# Patient Record
Sex: FEMALE | Race: WHITE
[De-identification: names, ages, dates, MRNs, and addresses within clinical notes are randomized per-mention and may not be internally consistent; named-entity substitution may affect disease eponyms.]

---

## 2022-11-03 ENCOUNTER — HOSPITAL ENCOUNTER (OUTPATIENT)
Dept: HOSPITAL 100 - MTLAB | Age: 27
Discharge: HOME | End: 2022-11-03
Payer: COMMERCIAL

## 2022-11-03 DIAGNOSIS — M06.4: Primary | ICD-10-CM

## 2022-11-03 DIAGNOSIS — K58.2: ICD-10-CM

## 2022-11-03 DIAGNOSIS — R00.2: ICD-10-CM

## 2022-11-03 DIAGNOSIS — R76.8: ICD-10-CM

## 2022-11-03 DIAGNOSIS — G43.909: ICD-10-CM

## 2022-11-03 LAB
ALANINE AMINOTRANSFER ALT/SGPT: 24 U/L (ref 13–56)
ALBUMIN SERPL-MCNC: 4.2 G/DL (ref 3.2–5)
ALKALINE PHOSPHATASE: 63 U/L (ref 45–117)
ANION GAP: 6 (ref 5–15)
APTT PPP: 28.9 SECONDS (ref 24.1–36.2)
AST(SGOT): 18 U/L (ref 15–37)
BUN SERPL-MCNC: 12 MG/DL (ref 7–18)
BUN/CREAT RATIO: 13.5 RATIO (ref 10–20)
CALCIUM SERPL-MCNC: 9.2 MG/DL (ref 8.5–10.1)
CARBON DIOXIDE: 25 MMOL/L (ref 21–32)
CHLORIDE: 106 MMOL/L (ref 98–107)
CREAT UR-MCNC: 143 MG/DL
DEPRECATED RDW RBC: 41.1 FL (ref 35.1–43.9)
ERYTHROCYTE [DISTWIDTH] IN BLOOD: 12.6 % (ref 11.6–14.6)
EST GLOM FILT RATE - AFR AMER: 97 ML/MIN (ref 60–?)
EXAGEN: (no result)
GLOBULIN: 3.8 G/DL (ref 2.2–4.2)
GLUCOSE: 85 MG/DL (ref 74–106)
HCT VFR BLD AUTO: 40.8 % (ref 37–47)
HEMOGLOBIN: 13.4 G/DL (ref 12–15)
HGB BLD-MCNC: 13.4 G/DL (ref 12–15)
IMMATURE GRANULOCYTES COUNT: 0.01 X10^3/UL (ref 0–0)
LEUKOCYTE ESTERASE UR QL STRIP: 500 /UL
MCV RBC: 89.3 FL (ref 81–99)
MEAN CORP HGB CONC: 32.8 G/DL (ref 32–36)
MEAN PLATELET VOL.: 9.9 FL (ref 6.2–12)
NRBC FLAGGED BY ANALYZER: 0 % (ref 0–5)
PLATELET # BLD: 258 K/MM3 (ref 150–450)
PLATELET COUNT: 258 K/MM3 (ref 150–450)
POTASSIUM: 4 MMOL/L (ref 3.5–5.1)
PROT UR-MCNC: 10 MG/DL (ref ?–11.9)
PROT/CREAT UR: 70 MG/G CRE (ref 0–200)
PROTHROMBIN TIME (PROTIME)PT.: 12.9 SECONDS (ref 11.7–14.9)
RBC # BLD AUTO: 4.57 M/MM3 (ref 4.2–5.4)
RBC DISTRIBUTION WIDTH CV: 12.6 % (ref 11.6–14.6)
RBC DISTRIBUTION WIDTH SD: 41.1 FL (ref 35.1–43.9)
RBC UR QL: 10 /UL
SP GR UR: 1.01 (ref 1–1.03)
URINE PRESERVATIVE: (no result)
WBC # BLD AUTO: 6.2 K/MM3 (ref 4.4–11)
WHITE BLOOD COUNT: 6.2 K/MM3 (ref 4.4–11)

## 2022-11-03 PROCEDURE — 85730 THROMBOPLASTIN TIME PARTIAL: CPT

## 2022-11-03 PROCEDURE — 85598 HEXAGNAL PHOSPH PLTLT NEUTRL: CPT

## 2022-11-03 PROCEDURE — 82570 ASSAY OF URINE CREATININE: CPT

## 2022-11-03 PROCEDURE — 36415 COLL VENOUS BLD VENIPUNCTURE: CPT

## 2022-11-03 PROCEDURE — 84156 ASSAY OF PROTEIN URINE: CPT

## 2022-11-03 PROCEDURE — 85025 COMPLETE CBC W/AUTO DIFF WBC: CPT

## 2022-11-03 PROCEDURE — 85610 PROTHROMBIN TIME: CPT

## 2022-11-03 PROCEDURE — 81002 URINALYSIS NONAUTO W/O SCOPE: CPT

## 2022-11-03 PROCEDURE — 80053 COMPREHEN METABOLIC PANEL: CPT

## 2022-11-05 LAB
DILUTE RUSSELL VIPER VENOM: 36.7 SEC (ref 0–47)
PTT-LA: 38.5 SEC (ref 0–51.9)
SCREEN DRVVT: 36.7 SEC (ref 0–47)

## 2023-03-30 LAB
ALANINE AMINOTRANSFERASE (SGPT) (U/L) IN SER/PLAS: 15 U/L (ref 7–45)
ALBUMIN (G/DL) IN SER/PLAS: 4.4 G/DL (ref 3.4–5)
ALKALINE PHOSPHATASE (U/L) IN SER/PLAS: 56 U/L (ref 33–110)
ANION GAP IN SER/PLAS: 10 MMOL/L (ref 10–20)
ASPARTATE AMINOTRANSFERASE (SGOT) (U/L) IN SER/PLAS: 18 U/L (ref 9–39)
BASOPHILS (10*3/UL) IN BLOOD BY AUTOMATED COUNT: 0.04 X10E9/L (ref 0–0.1)
BASOPHILS/100 LEUKOCYTES IN BLOOD BY AUTOMATED COUNT: 0.5 % (ref 0–2)
BILIRUBIN TOTAL (MG/DL) IN SER/PLAS: 0.6 MG/DL (ref 0–1.2)
CALCIUM (MG/DL) IN SER/PLAS: 9.5 MG/DL (ref 8.6–10.3)
CARBON DIOXIDE, TOTAL (MMOL/L) IN SER/PLAS: 29 MMOL/L (ref 21–32)
CHLORIDE (MMOL/L) IN SER/PLAS: 106 MMOL/L (ref 98–107)
CREATININE (MG/DL) IN SER/PLAS: 0.85 MG/DL (ref 0.5–1.05)
EOSINOPHILS (10*3/UL) IN BLOOD BY AUTOMATED COUNT: 0.17 X10E9/L (ref 0–0.7)
EOSINOPHILS/100 LEUKOCYTES IN BLOOD BY AUTOMATED COUNT: 2.1 % (ref 0–6)
ERYTHROCYTE DISTRIBUTION WIDTH (RATIO) BY AUTOMATED COUNT: 12.6 % (ref 11.5–14.5)
ERYTHROCYTE MEAN CORPUSCULAR HEMOGLOBIN CONCENTRATION (G/DL) BY AUTOMATED: 31.1 G/DL (ref 32–36)
ERYTHROCYTE MEAN CORPUSCULAR VOLUME (FL) BY AUTOMATED COUNT: 95 FL (ref 80–100)
ERYTHROCYTES (10*6/UL) IN BLOOD BY AUTOMATED COUNT: 4.15 X10E12/L (ref 4–5.2)
GFR FEMALE: >90 ML/MIN/1.73M2
GLUCOSE (MG/DL) IN SER/PLAS: 88 MG/DL (ref 74–99)
HEMATOCRIT (%) IN BLOOD BY AUTOMATED COUNT: 39.2 % (ref 36–46)
HEMOGLOBIN (G/DL) IN BLOOD: 12.2 G/DL (ref 12–16)
IMMATURE GRANULOCYTES/100 LEUKOCYTES IN BLOOD BY AUTOMATED COUNT: 0.2 % (ref 0–0.9)
LEUKOCYTES (10*3/UL) IN BLOOD BY AUTOMATED COUNT: 8 X10E9/L (ref 4.4–11.3)
LYMPHOCYTES (10*3/UL) IN BLOOD BY AUTOMATED COUNT: 2.31 X10E9/L (ref 1.2–4.8)
LYMPHOCYTES/100 LEUKOCYTES IN BLOOD BY AUTOMATED COUNT: 28.8 % (ref 13–44)
MONOCYTES (10*3/UL) IN BLOOD BY AUTOMATED COUNT: 0.48 X10E9/L (ref 0.1–1)
MONOCYTES/100 LEUKOCYTES IN BLOOD BY AUTOMATED COUNT: 6 % (ref 2–10)
NEUTROPHILS (10*3/UL) IN BLOOD BY AUTOMATED COUNT: 5.01 X10E9/L (ref 1.2–7.7)
NEUTROPHILS/100 LEUKOCYTES IN BLOOD BY AUTOMATED COUNT: 62.4 % (ref 40–80)
PLATELETS (10*3/UL) IN BLOOD AUTOMATED COUNT: 280 X10E9/L (ref 150–450)
POTASSIUM (MMOL/L) IN SER/PLAS: 3.8 MMOL/L (ref 3.5–5.3)
PROTEIN TOTAL: 7 G/DL (ref 6.4–8.2)
SODIUM (MMOL/L) IN SER/PLAS: 141 MMOL/L (ref 136–145)
UREA NITROGEN (MG/DL) IN SER/PLAS: 12 MG/DL (ref 6–23)

## 2023-04-05 ENCOUNTER — HOSPITAL ENCOUNTER (OUTPATIENT)
Dept: HOSPITAL 100 - MTLAB | Age: 28
Discharge: HOME | End: 2023-04-05
Payer: COMMERCIAL

## 2023-04-05 DIAGNOSIS — Z79.899: ICD-10-CM

## 2023-04-05 DIAGNOSIS — M06.4: Primary | ICD-10-CM

## 2023-04-05 DIAGNOSIS — R76.8: ICD-10-CM

## 2023-04-05 LAB
ALANINE AMINOTRANSFER ALT/SGPT: 22 U/L (ref 13–56)
ALBUMIN SERPL-MCNC: 4.1 G/DL (ref 3.2–5)
ALKALINE PHOSPHATASE: 71 U/L (ref 45–117)
ANION GAP: 4 (ref 5–15)
AST(SGOT): 16 U/L (ref 15–37)
BUN SERPL-MCNC: 17 MG/DL (ref 7–18)
BUN/CREAT RATIO: 16.8 RATIO (ref 10–20)
CALCIUM SERPL-MCNC: 9.4 MG/DL (ref 8.5–10.1)
CARBON DIOXIDE: 26 MMOL/L (ref 21–32)
CHLORIDE: 108 MMOL/L (ref 98–107)
DEPRECATED RDW RBC: 42.7 FL (ref 35.1–43.9)
ERYTHROCYTE [DISTWIDTH] IN BLOOD: 12.5 % (ref 11.6–14.6)
EST GLOM FILT RATE - AFR AMER: 83 ML/MIN (ref 60–?)
GLOBULIN: 3.6 G/DL (ref 2.2–4.2)
GLUCOSE: 93 MG/DL (ref 74–106)
HCT VFR BLD AUTO: 36.4 % (ref 37–47)
HEMOGLOBIN: 12 G/DL (ref 12–15)
HGB BLD-MCNC: 12 G/DL (ref 12–15)
IMMATURE GRANULOCYTES COUNT: 0.01 X10^3/UL (ref 0–0)
MCV RBC: 92.2 FL (ref 81–99)
MEAN CORP HGB CONC: 33 G/DL (ref 32–36)
MEAN PLATELET VOL.: 9.8 FL (ref 6.2–12)
NRBC FLAGGED BY ANALYZER: 0 % (ref 0–5)
PLATELET # BLD: 292 K/MM3 (ref 150–450)
PLATELET COUNT: 292 K/MM3 (ref 150–450)
POTASSIUM: 4.3 MMOL/L (ref 3.5–5.1)
RBC # BLD AUTO: 3.95 M/MM3 (ref 4.2–5.4)
RBC DISTRIBUTION WIDTH CV: 12.5 % (ref 11.6–14.6)
RBC DISTRIBUTION WIDTH SD: 42.7 FL (ref 35.1–43.9)
WBC # BLD AUTO: 8.7 K/MM3 (ref 4.4–11)
WHITE BLOOD COUNT: 8.7 K/MM3 (ref 4.4–11)

## 2023-04-05 PROCEDURE — 36415 COLL VENOUS BLD VENIPUNCTURE: CPT

## 2023-04-05 PROCEDURE — 80053 COMPREHEN METABOLIC PANEL: CPT

## 2023-04-05 PROCEDURE — 85025 COMPLETE CBC W/AUTO DIFF WBC: CPT

## 2023-04-05 PROCEDURE — 82955 ASSAY OF G6PD ENZYME: CPT

## 2023-04-08 LAB — RBC # BLD: 3.94 X10E6/UL (ref 3.77–5.28)

## 2023-04-26 ENCOUNTER — TELEPHONE (OUTPATIENT)
Dept: PRIMARY CARE | Facility: CLINIC | Age: 28
End: 2023-04-26
Payer: COMMERCIAL

## 2023-04-27 PROBLEM — R23.3 EASY BRUISING: Status: ACTIVE | Noted: 2023-04-27

## 2023-04-27 PROBLEM — K63.5 COLON POLYP: Status: ACTIVE | Noted: 2023-04-27

## 2023-04-27 PROBLEM — R53.83 FATIGUE: Status: ACTIVE | Noted: 2023-04-27

## 2023-04-27 PROBLEM — R76.8 POSITIVE ANA (ANTINUCLEAR ANTIBODY): Status: ACTIVE | Noted: 2023-04-27

## 2023-04-27 PROBLEM — R60.9 EDEMA: Status: ACTIVE | Noted: 2023-04-27

## 2023-04-27 PROBLEM — J45.909 CHILDHOOD ASTHMA (HHS-HCC): Status: ACTIVE | Noted: 2023-04-27

## 2023-04-27 PROBLEM — M25.50 MULTIPLE JOINT PAIN: Status: ACTIVE | Noted: 2023-04-27

## 2023-04-27 PROBLEM — R00.0 SINUS TACHYCARDIA: Status: ACTIVE | Noted: 2023-04-27

## 2023-04-27 PROBLEM — J02.9 PHARYNGITIS: Status: ACTIVE | Noted: 2023-04-27

## 2023-04-27 PROBLEM — G56.02 CARPAL TUNNEL SYNDROME OF LEFT WRIST: Status: ACTIVE | Noted: 2023-04-27

## 2023-04-27 PROBLEM — M25.561 KNEE PAIN, BILATERAL: Status: ACTIVE | Noted: 2023-04-27

## 2023-04-27 PROBLEM — R00.2 PALPITATIONS: Status: ACTIVE | Noted: 2023-04-27

## 2023-04-27 PROBLEM — R52 GENERALIZED BODY ACHES: Status: ACTIVE | Noted: 2023-04-27

## 2023-04-27 PROBLEM — M25.562 KNEE PAIN, BILATERAL: Status: ACTIVE | Noted: 2023-04-27

## 2023-04-27 PROBLEM — K58.2 IRRITABLE BOWEL SYNDROME WITH BOTH CONSTIPATION AND DIARRHEA: Status: ACTIVE | Noted: 2023-04-27

## 2023-04-27 PROBLEM — G43.909 MIGRAINE HEADACHE: Status: ACTIVE | Noted: 2023-04-27

## 2023-04-27 PROBLEM — M06.9 RHEUMATOID ARTHRITIS (MULTI): Status: ACTIVE | Noted: 2023-04-27

## 2023-04-27 RX ORDER — METHOTREXATE 2.5 MG/1
2.5 TABLET ORAL
COMMUNITY
Start: 2022-11-22 | End: 2023-05-04 | Stop reason: ALTCHOICE

## 2023-04-27 RX ORDER — PREDNISONE 10 MG/1
10 TABLET ORAL
COMMUNITY
Start: 2022-08-11 | End: 2024-03-21 | Stop reason: ALTCHOICE

## 2023-04-27 RX ORDER — SUMATRIPTAN 50 MG/1
50 TABLET, FILM COATED ORAL 2 TIMES DAILY
COMMUNITY
Start: 2022-07-21

## 2023-05-04 ENCOUNTER — OFFICE VISIT (OUTPATIENT)
Dept: PRIMARY CARE | Facility: CLINIC | Age: 28
End: 2023-05-04
Payer: COMMERCIAL

## 2023-05-04 VITALS
SYSTOLIC BLOOD PRESSURE: 124 MMHG | HEIGHT: 61 IN | DIASTOLIC BLOOD PRESSURE: 72 MMHG | WEIGHT: 135 LBS | BODY MASS INDEX: 25.49 KG/M2

## 2023-05-04 DIAGNOSIS — E83.51 HYPOCALCEMIA: ICD-10-CM

## 2023-05-04 DIAGNOSIS — E87.6 HYPOKALEMIA: ICD-10-CM

## 2023-05-04 DIAGNOSIS — R53.83 OTHER FATIGUE: Primary | ICD-10-CM

## 2023-05-04 DIAGNOSIS — M05.79 RHEUMATOID ARTHRITIS INVOLVING MULTIPLE SITES WITH POSITIVE RHEUMATOID FACTOR (MULTI): ICD-10-CM

## 2023-05-04 PROBLEM — J02.9 PHARYNGITIS: Status: RESOLVED | Noted: 2023-04-27 | Resolved: 2023-05-04

## 2023-05-04 PROCEDURE — 1036F TOBACCO NON-USER: CPT | Performed by: INTERNAL MEDICINE

## 2023-05-04 PROCEDURE — 99214 OFFICE O/P EST MOD 30 MIN: CPT | Performed by: INTERNAL MEDICINE

## 2023-05-04 RX ORDER — IBUPROFEN 800 MG/1
800 TABLET ORAL 3 TIMES DAILY PRN
COMMUNITY
Start: 2023-04-05

## 2023-05-04 RX ORDER — SULFASALAZINE 500 MG/1
500 TABLET, DELAYED RELEASE ORAL
COMMUNITY
Start: 2023-05-02 | End: 2024-03-21 | Stop reason: ALTCHOICE

## 2023-05-04 RX ORDER — FOLIC ACID 1 MG/1
1 TABLET ORAL DAILY
COMMUNITY
End: 2023-05-04 | Stop reason: ALTCHOICE

## 2023-05-04 RX ORDER — DICLOFENAC SODIUM 10 MG/G
GEL TOPICAL
COMMUNITY
Start: 2022-07-28

## 2023-05-04 RX ORDER — SYRINGE, DISPOSABLE, 1 ML
SYRINGE, EMPTY DISPOSABLE MISCELLANEOUS
COMMUNITY
Start: 2023-02-22

## 2023-05-04 RX ORDER — LEUCOVORIN CALCIUM 25 MG/1
25 TABLET ORAL
COMMUNITY
Start: 2023-03-25 | End: 2024-03-21 | Stop reason: ALTCHOICE

## 2023-05-04 ASSESSMENT — PATIENT HEALTH QUESTIONNAIRE - PHQ9
2. FEELING DOWN, DEPRESSED OR HOPELESS: NOT AT ALL
1. LITTLE INTEREST OR PLEASURE IN DOING THINGS: NOT AT ALL
SUM OF ALL RESPONSES TO PHQ9 QUESTIONS 1 AND 2: 0

## 2023-05-04 ASSESSMENT — ENCOUNTER SYMPTOMS
DIARRHEA: 0
PSYCHIATRIC NEGATIVE: 1
DYSURIA: 0
RHINORRHEA: 0
FEVER: 0
ABDOMINAL DISTENTION: 0
NEUROLOGICAL NEGATIVE: 1
CHILLS: 0
WHEEZING: 0
PALPITATIONS: 0
ENDOCRINE NEGATIVE: 1
DIZZINESS: 0
AGITATION: 0
BACK PAIN: 0
FATIGUE: 1
WEAKNESS: 0
SHORTNESS OF BREATH: 0
LIGHT-HEADEDNESS: 0
EYES NEGATIVE: 1
VOMITING: 0
HEADACHES: 0
ABDOMINAL PAIN: 0
NAUSEA: 0
MUSCULOSKELETAL NEGATIVE: 1
COUGH: 0
CONSTIPATION: 0
GASTROINTESTINAL NEGATIVE: 1
CARDIOVASCULAR NEGATIVE: 1

## 2023-05-04 NOTE — PROGRESS NOTES
Subjective   Patient ID: Marisabel Cowan is a 28 y.o. female who presents for fmla.  HPI  Lab F/U, F/U AFTER ER VISIT FOR RA FLARE UP. NEEDS FMLA FORM TO BE DONE . SULFASALAZINE HELPS WITH RA. HAS CHRONIC FATIGUE.  Review of Systems   Constitutional:  Positive for fatigue. Negative for chills and fever.   HENT: Negative.  Negative for congestion, postnasal drip and rhinorrhea.    Eyes: Negative.  Negative for visual disturbance.   Respiratory:  Negative for cough, shortness of breath and wheezing.    Cardiovascular: Negative.  Negative for chest pain, palpitations and leg swelling.   Gastrointestinal: Negative.  Negative for abdominal distention, abdominal pain, constipation, diarrhea, nausea and vomiting.   Endocrine: Negative.    Genitourinary:  Negative for dysuria and urgency.   Musculoskeletal: Negative.  Negative for back pain.   Skin: Negative.  Negative for rash.   Allergic/Immunologic: Negative for immunocompromised state.   Neurological: Negative.  Negative for dizziness, weakness, light-headedness and headaches.   Psychiatric/Behavioral: Negative.  Negative for agitation.        Objective   Physical Exam  Constitutional:       General: She is not in acute distress.  HENT:      Head: Normocephalic.      Nose: Nose normal.      Mouth/Throat:      Mouth: Mucous membranes are moist.   Eyes:      Conjunctiva/sclera: Conjunctivae normal.      Pupils: Pupils are equal, round, and reactive to light.   Cardiovascular:      Rate and Rhythm: Normal rate and regular rhythm.      Pulses: Normal pulses.      Heart sounds: Normal heart sounds.   Pulmonary:      Effort: No respiratory distress.      Breath sounds: No wheezing.   Chest:      Chest wall: No tenderness.   Abdominal:      General: Abdomen is flat. Bowel sounds are normal.      Palpations: Abdomen is soft.      Tenderness: There is no abdominal tenderness.   Musculoskeletal:         General: No tenderness. Normal range of motion.      Cervical back: Normal  range of motion.   Lymphadenopathy:      Cervical: No cervical adenopathy.   Skin:     General: Skin is warm and dry.      Findings: No rash.   Neurological:      General: No focal deficit present.      Mental Status: She is alert. Mental status is at baseline.   Psychiatric:         Mood and Affect: Mood normal.         Behavior: Behavior normal.         Assessment/Plan   1. Other fatigue  CBC and Auto Differential    Vitamin B12    Methylmalonic Acid    Folate    Iron and TIBC    Ferritin      2. Rheumatoid arthritis involving multiple sites with positive rheumatoid factor (CMS/HCC)        3. Hypokalemia  Comprehensive Metabolic Panel      4. Hypocalcemia  Comprehensive Metabolic Panel        FMLA FORM IS DONE TODAY.  ADVISED TO HAVE LOW FAT AND LOW CALORIE , HIGH PROTEIN, HIGH K/CA DIET , DAILY EXERCISE.  ADVISED TO CUT DOWN CAFFEINE.   ALL ASSESSED CHRONIC CONDITIONS ARE STABLE OR ADDRESSED.    MDM    1) COMPLEXITY: MORE THAN 1 STABLE CHRONIC CONDITION ADDRESSED  2)DATA: TESTS INTERPRETED AND OR ORDERED, TOOK INDEPENDENT HISTORY OR RECORDS REVIEWED  3)RISK: MODERATE RISK DUE TO NATURE OF MEDICAL CONDITIONS/COMORBIDITY OR MEDICATIONS ORDERED OR SURGICAL OR PROCEDURE REFERRAL, .      1 mon

## 2023-06-01 ENCOUNTER — APPOINTMENT (OUTPATIENT)
Dept: PRIMARY CARE | Facility: CLINIC | Age: 28
End: 2023-06-01
Payer: COMMERCIAL

## 2023-06-05 LAB
ALANINE AMINOTRANSFERASE (SGPT) (U/L) IN SER/PLAS: 28 U/L (ref 7–45)
ALBUMIN (G/DL) IN SER/PLAS: 4.5 G/DL (ref 3.4–5)
ALKALINE PHOSPHATASE (U/L) IN SER/PLAS: 58 U/L (ref 33–110)
ANION GAP IN SER/PLAS: 11 MMOL/L (ref 10–20)
ASPARTATE AMINOTRANSFERASE (SGOT) (U/L) IN SER/PLAS: 21 U/L (ref 9–39)
BASOPHILS (10*3/UL) IN BLOOD BY AUTOMATED COUNT: 0.03 X10E9/L (ref 0–0.1)
BASOPHILS/100 LEUKOCYTES IN BLOOD BY AUTOMATED COUNT: 0.4 % (ref 0–2)
BILIRUBIN TOTAL (MG/DL) IN SER/PLAS: 0.5 MG/DL (ref 0–1.2)
CALCIUM (MG/DL) IN SER/PLAS: 9.2 MG/DL (ref 8.6–10.3)
CARBON DIOXIDE, TOTAL (MMOL/L) IN SER/PLAS: 27 MMOL/L (ref 21–32)
CHLORIDE (MMOL/L) IN SER/PLAS: 105 MMOL/L (ref 98–107)
CREATININE (MG/DL) IN SER/PLAS: 0.83 MG/DL (ref 0.5–1.05)
EOSINOPHILS (10*3/UL) IN BLOOD BY AUTOMATED COUNT: 0.94 X10E9/L (ref 0–0.7)
EOSINOPHILS/100 LEUKOCYTES IN BLOOD BY AUTOMATED COUNT: 11.7 % (ref 0–6)
ERYTHROCYTE DISTRIBUTION WIDTH (RATIO) BY AUTOMATED COUNT: 12.7 % (ref 11.5–14.5)
ERYTHROCYTE MEAN CORPUSCULAR HEMOGLOBIN CONCENTRATION (G/DL) BY AUTOMATED: 32 G/DL (ref 32–36)
ERYTHROCYTE MEAN CORPUSCULAR VOLUME (FL) BY AUTOMATED COUNT: 92 FL (ref 80–100)
ERYTHROCYTES (10*6/UL) IN BLOOD BY AUTOMATED COUNT: 4.04 X10E12/L (ref 4–5.2)
GFR FEMALE: >90 ML/MIN/1.73M2
GLUCOSE (MG/DL) IN SER/PLAS: 92 MG/DL (ref 74–99)
HEMATOCRIT (%) IN BLOOD BY AUTOMATED COUNT: 37.2 % (ref 36–46)
HEMOGLOBIN (G/DL) IN BLOOD: 11.9 G/DL (ref 12–16)
IMMATURE GRANULOCYTES/100 LEUKOCYTES IN BLOOD BY AUTOMATED COUNT: 1.2 % (ref 0–0.9)
LEUKOCYTES (10*3/UL) IN BLOOD BY AUTOMATED COUNT: 8.1 X10E9/L (ref 4.4–11.3)
LYMPHOCYTES (10*3/UL) IN BLOOD BY AUTOMATED COUNT: 2.25 X10E9/L (ref 1.2–4.8)
LYMPHOCYTES/100 LEUKOCYTES IN BLOOD BY AUTOMATED COUNT: 27.9 % (ref 13–44)
MONOCYTES (10*3/UL) IN BLOOD BY AUTOMATED COUNT: 0.62 X10E9/L (ref 0.1–1)
MONOCYTES/100 LEUKOCYTES IN BLOOD BY AUTOMATED COUNT: 7.7 % (ref 2–10)
NEUTROPHILS (10*3/UL) IN BLOOD BY AUTOMATED COUNT: 4.12 X10E9/L (ref 1.2–7.7)
NEUTROPHILS/100 LEUKOCYTES IN BLOOD BY AUTOMATED COUNT: 51.1 % (ref 40–80)
PLATELETS (10*3/UL) IN BLOOD AUTOMATED COUNT: 253 X10E9/L (ref 150–450)
POTASSIUM (MMOL/L) IN SER/PLAS: 3.7 MMOL/L (ref 3.5–5.3)
PROTEIN TOTAL: 7.4 G/DL (ref 6.4–8.2)
SODIUM (MMOL/L) IN SER/PLAS: 139 MMOL/L (ref 136–145)
UREA NITROGEN (MG/DL) IN SER/PLAS: 13 MG/DL (ref 6–23)

## 2023-07-18 LAB
ALANINE AMINOTRANSFERASE (SGPT) (U/L) IN SER/PLAS: 14 U/L (ref 7–45)
ALBUMIN (G/DL) IN SER/PLAS: 4.2 G/DL (ref 3.4–5)
ALKALINE PHOSPHATASE (U/L) IN SER/PLAS: 57 U/L (ref 33–110)
ANION GAP IN SER/PLAS: 9 MMOL/L (ref 10–20)
ASPARTATE AMINOTRANSFERASE (SGOT) (U/L) IN SER/PLAS: 22 U/L (ref 9–39)
BASOPHILS (10*3/UL) IN BLOOD BY AUTOMATED COUNT: 0.03 X10E9/L (ref 0–0.1)
BASOPHILS/100 LEUKOCYTES IN BLOOD BY AUTOMATED COUNT: 0.5 % (ref 0–2)
BILIRUBIN TOTAL (MG/DL) IN SER/PLAS: 0.4 MG/DL (ref 0–1.2)
CALCIUM (MG/DL) IN SER/PLAS: 9 MG/DL (ref 8.6–10.3)
CARBON DIOXIDE, TOTAL (MMOL/L) IN SER/PLAS: 28 MMOL/L (ref 21–32)
CHLORIDE (MMOL/L) IN SER/PLAS: 107 MMOL/L (ref 98–107)
CREATININE (MG/DL) IN SER/PLAS: 0.85 MG/DL (ref 0.5–1.05)
EOSINOPHILS (10*3/UL) IN BLOOD BY AUTOMATED COUNT: 0.18 X10E9/L (ref 0–0.7)
EOSINOPHILS/100 LEUKOCYTES IN BLOOD BY AUTOMATED COUNT: 3 % (ref 0–6)
ERYTHROCYTE DISTRIBUTION WIDTH (RATIO) BY AUTOMATED COUNT: 12.2 % (ref 11.5–14.5)
ERYTHROCYTE MEAN CORPUSCULAR HEMOGLOBIN CONCENTRATION (G/DL) BY AUTOMATED: 30.8 G/DL (ref 32–36)
ERYTHROCYTE MEAN CORPUSCULAR VOLUME (FL) BY AUTOMATED COUNT: 95 FL (ref 80–100)
ERYTHROCYTES (10*6/UL) IN BLOOD BY AUTOMATED COUNT: 3.75 X10E12/L (ref 4–5.2)
GFR FEMALE: >90 ML/MIN/1.73M2
GLUCOSE (MG/DL) IN SER/PLAS: 87 MG/DL (ref 74–99)
HEMATOCRIT (%) IN BLOOD BY AUTOMATED COUNT: 35.7 % (ref 36–46)
HEMOGLOBIN (G/DL) IN BLOOD: 11 G/DL (ref 12–16)
IMMATURE GRANULOCYTES/100 LEUKOCYTES IN BLOOD BY AUTOMATED COUNT: 0.2 % (ref 0–0.9)
LEUKOCYTES (10*3/UL) IN BLOOD BY AUTOMATED COUNT: 5.9 X10E9/L (ref 4.4–11.3)
LYMPHOCYTES (10*3/UL) IN BLOOD BY AUTOMATED COUNT: 2.37 X10E9/L (ref 1.2–4.8)
LYMPHOCYTES/100 LEUKOCYTES IN BLOOD BY AUTOMATED COUNT: 40 % (ref 13–44)
MONOCYTES (10*3/UL) IN BLOOD BY AUTOMATED COUNT: 0.52 X10E9/L (ref 0.1–1)
MONOCYTES/100 LEUKOCYTES IN BLOOD BY AUTOMATED COUNT: 8.8 % (ref 2–10)
NEUTROPHILS (10*3/UL) IN BLOOD BY AUTOMATED COUNT: 2.82 X10E9/L (ref 1.2–7.7)
NEUTROPHILS/100 LEUKOCYTES IN BLOOD BY AUTOMATED COUNT: 47.5 % (ref 40–80)
PLATELETS (10*3/UL) IN BLOOD AUTOMATED COUNT: 260 X10E9/L (ref 150–450)
POTASSIUM (MMOL/L) IN SER/PLAS: 3.7 MMOL/L (ref 3.5–5.3)
PROTEIN TOTAL: 6.8 G/DL (ref 6.4–8.2)
SODIUM (MMOL/L) IN SER/PLAS: 140 MMOL/L (ref 136–145)
UREA NITROGEN (MG/DL) IN SER/PLAS: 12 MG/DL (ref 6–23)

## 2023-09-25 ENCOUNTER — LAB (OUTPATIENT)
Dept: LAB | Facility: LAB | Age: 28
End: 2023-09-25
Payer: COMMERCIAL

## 2023-09-25 DIAGNOSIS — E83.51 HYPOCALCEMIA: ICD-10-CM

## 2023-09-25 DIAGNOSIS — E87.6 HYPOKALEMIA: ICD-10-CM

## 2023-09-25 DIAGNOSIS — R53.83 OTHER FATIGUE: ICD-10-CM

## 2023-09-25 LAB
ALANINE AMINOTRANSFERASE (SGPT) (U/L) IN SER/PLAS: 13 U/L (ref 7–45)
ALBUMIN (G/DL) IN SER/PLAS: 4.3 G/DL (ref 3.4–5)
ALKALINE PHOSPHATASE (U/L) IN SER/PLAS: 58 U/L (ref 33–110)
ANION GAP IN SER/PLAS: 8 MMOL/L (ref 10–20)
ASPARTATE AMINOTRANSFERASE (SGOT) (U/L) IN SER/PLAS: 14 U/L (ref 9–39)
BASOPHILS (10*3/UL) IN BLOOD BY AUTOMATED COUNT: 0.04 X10E9/L (ref 0–0.1)
BASOPHILS/100 LEUKOCYTES IN BLOOD BY AUTOMATED COUNT: 0.5 % (ref 0–2)
BILIRUBIN TOTAL (MG/DL) IN SER/PLAS: 0.3 MG/DL (ref 0–1.2)
CALCIUM (MG/DL) IN SER/PLAS: 9.1 MG/DL (ref 8.6–10.3)
CARBON DIOXIDE, TOTAL (MMOL/L) IN SER/PLAS: 32 MMOL/L (ref 21–32)
CHLORIDE (MMOL/L) IN SER/PLAS: 104 MMOL/L (ref 98–107)
COBALAMIN (VITAMIN B12) (PG/ML) IN SER/PLAS: 190 PG/ML (ref 211–911)
CREATININE (MG/DL) IN SER/PLAS: 0.73 MG/DL (ref 0.5–1.05)
EOSINOPHILS (10*3/UL) IN BLOOD BY AUTOMATED COUNT: 0.32 X10E9/L (ref 0–0.7)
EOSINOPHILS/100 LEUKOCYTES IN BLOOD BY AUTOMATED COUNT: 4.4 % (ref 0–6)
ERYTHROCYTE DISTRIBUTION WIDTH (RATIO) BY AUTOMATED COUNT: 12.2 % (ref 11.5–14.5)
ERYTHROCYTE MEAN CORPUSCULAR HEMOGLOBIN CONCENTRATION (G/DL) BY AUTOMATED: 31.6 G/DL (ref 32–36)
ERYTHROCYTE MEAN CORPUSCULAR VOLUME (FL) BY AUTOMATED COUNT: 93 FL (ref 80–100)
ERYTHROCYTES (10*6/UL) IN BLOOD BY AUTOMATED COUNT: 3.73 X10E12/L (ref 4–5.2)
FERRITIN (UG/LL) IN SER/PLAS: 57 UG/L (ref 8–150)
FOLATE (NG/ML) IN SER/PLAS: 14 NG/ML
GFR FEMALE: >90 ML/MIN/1.73M2
GLUCOSE (MG/DL) IN SER/PLAS: 77 MG/DL (ref 74–99)
HEMATOCRIT (%) IN BLOOD BY AUTOMATED COUNT: 34.8 % (ref 36–46)
HEMOGLOBIN (G/DL) IN BLOOD: 11 G/DL (ref 12–16)
IMMATURE GRANULOCYTES/100 LEUKOCYTES IN BLOOD BY AUTOMATED COUNT: 0.3 % (ref 0–0.9)
IRON (UG/DL) IN SER/PLAS: 40 UG/DL (ref 35–150)
IRON BINDING CAPACITY (UG/DL) IN SER/PLAS: 284 UG/DL (ref 240–445)
IRON SATURATION (%) IN SER/PLAS: 14 % (ref 25–45)
LEUKOCYTES (10*3/UL) IN BLOOD BY AUTOMATED COUNT: 7.3 X10E9/L (ref 4.4–11.3)
LYMPHOCYTES (10*3/UL) IN BLOOD BY AUTOMATED COUNT: 2.38 X10E9/L (ref 1.2–4.8)
LYMPHOCYTES/100 LEUKOCYTES IN BLOOD BY AUTOMATED COUNT: 32.5 % (ref 13–44)
MONOCYTES (10*3/UL) IN BLOOD BY AUTOMATED COUNT: 0.59 X10E9/L (ref 0.1–1)
MONOCYTES/100 LEUKOCYTES IN BLOOD BY AUTOMATED COUNT: 8 % (ref 2–10)
NEUTROPHILS (10*3/UL) IN BLOOD BY AUTOMATED COUNT: 3.98 X10E9/L (ref 1.2–7.7)
NEUTROPHILS/100 LEUKOCYTES IN BLOOD BY AUTOMATED COUNT: 54.3 % (ref 40–80)
PLATELETS (10*3/UL) IN BLOOD AUTOMATED COUNT: 247 X10E9/L (ref 150–450)
POTASSIUM (MMOL/L) IN SER/PLAS: 3.6 MMOL/L (ref 3.5–5.3)
PROTEIN TOTAL: 6.7 G/DL (ref 6.4–8.2)
SODIUM (MMOL/L) IN SER/PLAS: 140 MMOL/L (ref 136–145)
UREA NITROGEN (MG/DL) IN SER/PLAS: 14 MG/DL (ref 6–23)

## 2023-09-25 PROCEDURE — 83550 IRON BINDING TEST: CPT

## 2023-09-25 PROCEDURE — 85025 COMPLETE CBC W/AUTO DIFF WBC: CPT

## 2023-09-25 PROCEDURE — 80053 COMPREHEN METABOLIC PANEL: CPT

## 2023-09-25 PROCEDURE — 82728 ASSAY OF FERRITIN: CPT

## 2023-09-25 PROCEDURE — 83921 ORGANIC ACID SINGLE QUANT: CPT

## 2023-09-25 PROCEDURE — 82746 ASSAY OF FOLIC ACID SERUM: CPT

## 2023-09-25 PROCEDURE — 83540 ASSAY OF IRON: CPT

## 2023-09-25 PROCEDURE — 36415 COLL VENOUS BLD VENIPUNCTURE: CPT

## 2023-09-25 PROCEDURE — 82607 VITAMIN B-12: CPT

## 2023-09-29 LAB — METHYLMALONIC ACID, S: 0.28 UMOL/L (ref 0–0.4)

## 2023-10-19 ENCOUNTER — OFFICE VISIT (OUTPATIENT)
Dept: PRIMARY CARE | Facility: CLINIC | Age: 28
End: 2023-10-19
Payer: COMMERCIAL

## 2023-10-19 VITALS
BODY MASS INDEX: 25.11 KG/M2 | DIASTOLIC BLOOD PRESSURE: 60 MMHG | HEIGHT: 61 IN | SYSTOLIC BLOOD PRESSURE: 100 MMHG | WEIGHT: 133 LBS | OXYGEN SATURATION: 99 % | HEART RATE: 80 BPM

## 2023-10-19 DIAGNOSIS — G47.00 INSOMNIA, UNSPECIFIED TYPE: ICD-10-CM

## 2023-10-19 DIAGNOSIS — R45.4 EXCESSIVE ANGER: ICD-10-CM

## 2023-10-19 DIAGNOSIS — M05.79 RHEUMATOID ARTHRITIS INVOLVING MULTIPLE SITES WITH POSITIVE RHEUMATOID FACTOR (MULTI): ICD-10-CM

## 2023-10-19 DIAGNOSIS — I95.9 HYPOTENSIVE EPISODE: ICD-10-CM

## 2023-10-19 DIAGNOSIS — R42 DIZZINESS: Primary | ICD-10-CM

## 2023-10-19 DIAGNOSIS — G43.909 MIGRAINE WITHOUT STATUS MIGRAINOSUS, NOT INTRACTABLE, UNSPECIFIED MIGRAINE TYPE: ICD-10-CM

## 2023-10-19 DIAGNOSIS — R11.0 NAUSEA: ICD-10-CM

## 2023-10-19 DIAGNOSIS — E53.8 VITAMIN B12 DEFICIENCY: ICD-10-CM

## 2023-10-19 DIAGNOSIS — F41.9 ANXIETY: ICD-10-CM

## 2023-10-19 PROCEDURE — 99214 OFFICE O/P EST MOD 30 MIN: CPT | Performed by: INTERNAL MEDICINE

## 2023-10-19 PROCEDURE — 1036F TOBACCO NON-USER: CPT | Performed by: INTERNAL MEDICINE

## 2023-10-19 RX ORDER — AMITRIPTYLINE HYDROCHLORIDE 10 MG/1
10 TABLET, FILM COATED ORAL NIGHTLY
Qty: 90 TABLET | Refills: 3 | Status: SHIPPED | OUTPATIENT
Start: 2023-10-19 | End: 2023-12-14 | Stop reason: ALTCHOICE

## 2023-10-19 RX ORDER — BUSPIRONE HYDROCHLORIDE 5 MG/1
5 TABLET ORAL 3 TIMES DAILY
Qty: 40 TABLET | Refills: 3 | Status: SHIPPED | OUTPATIENT
Start: 2023-10-19 | End: 2023-12-14 | Stop reason: ALTCHOICE

## 2023-10-19 RX ORDER — ONDANSETRON 4 MG/1
4 TABLET, FILM COATED ORAL 3 TIMES DAILY PRN
Qty: 20 TABLET | Refills: 1 | Status: SHIPPED | OUTPATIENT
Start: 2023-10-19 | End: 2024-03-21 | Stop reason: ALTCHOICE

## 2023-10-19 RX ORDER — METHOTREXATE 25 MG/ML
INJECTION, SOLUTION INTRA-ARTERIAL; INTRAMUSCULAR; INTRAVENOUS
COMMUNITY
Start: 2023-02-21 | End: 2023-10-19 | Stop reason: ALTCHOICE

## 2023-10-19 ASSESSMENT — ENCOUNTER SYMPTOMS
MUSCULOSKELETAL NEGATIVE: 1
LIGHT-HEADEDNESS: 0
AGITATION: 1
ENDOCRINE NEGATIVE: 1
COUGH: 0
CHILLS: 0
ABDOMINAL DISTENTION: 0
CONSTIPATION: 0
BACK PAIN: 0
SHORTNESS OF BREATH: 0
RHINORRHEA: 0
HEADACHES: 0
DYSURIA: 0
CARDIOVASCULAR NEGATIVE: 1
NERVOUS/ANXIOUS: 1
ABDOMINAL PAIN: 0
NAUSEA: 1
WEAKNESS: 0
VOMITING: 0
DIARRHEA: 0
FEVER: 0
WHEEZING: 0
PALPITATIONS: 0
DIZZINESS: 1
EYES NEGATIVE: 1

## 2023-10-19 ASSESSMENT — PATIENT HEALTH QUESTIONNAIRE - PHQ9
1. LITTLE INTEREST OR PLEASURE IN DOING THINGS: NOT AT ALL
2. FEELING DOWN, DEPRESSED OR HOPELESS: NOT AT ALL
SUM OF ALL RESPONSES TO PHQ9 QUESTIONS 1 AND 2: 0

## 2023-10-19 NOTE — PROGRESS NOTES
Subjective   Patient ID: Marisabel Cowan is a 28 y.o. female who presents for Follow-up (FOLLOW UP LAST SEEN 05/04/23/C/O DIZZINESS, HEART PALPITATIONS AND ANXIETY HAS GOTTEN WORSE IN THE PAST MONTH.).  HPI  Lab F/U (BY RHEUMATOLOGIST) . COMPLAINS OF EXCESSIVE ANGER, ANXIETY,POOR SLEEP AND WORSENING MIGRAINES (MORE FREQUENT, MORE SEVERE 6/10). POSITIONAL DIZZINESS LASTING FOR FEW SECONDS. HAD MORE DIZZINESS LAST NIGHT WHICH NAUSEA/VOMITING.  Review of Systems   Constitutional:  Negative for chills and fever.   HENT: Negative.  Negative for congestion, postnasal drip and rhinorrhea.    Eyes: Negative.  Negative for visual disturbance.   Respiratory:  Negative for cough, shortness of breath and wheezing.    Cardiovascular: Negative.  Negative for chest pain, palpitations and leg swelling.   Gastrointestinal:  Positive for nausea. Negative for abdominal distention, abdominal pain, constipation, diarrhea and vomiting.   Endocrine: Negative.    Genitourinary:  Negative for dysuria and urgency.   Musculoskeletal: Negative.  Negative for back pain.   Skin: Negative.  Negative for rash.   Allergic/Immunologic: Negative for immunocompromised state.   Neurological:  Positive for dizziness. Negative for weakness, light-headedness and headaches.        HEADACHES   Psychiatric/Behavioral:  Positive for agitation. The patient is nervous/anxious.         EXCESSIVE ANGER       Objective   Physical Exam  Constitutional:       General: She is not in acute distress.  HENT:      Head: Normocephalic.      Nose: Nose normal.      Mouth/Throat:      Mouth: Mucous membranes are moist.   Eyes:      Conjunctiva/sclera: Conjunctivae normal.      Pupils: Pupils are equal, round, and reactive to light.   Cardiovascular:      Rate and Rhythm: Normal rate and regular rhythm.      Pulses: Normal pulses.      Heart sounds: Normal heart sounds.   Pulmonary:      Effort: No respiratory distress.      Breath sounds: No wheezing.   Chest:      Chest  wall: No tenderness.   Abdominal:      General: Abdomen is flat. Bowel sounds are normal.      Palpations: Abdomen is soft.      Tenderness: There is no abdominal tenderness.   Musculoskeletal:         General: No tenderness. Normal range of motion.      Cervical back: Normal range of motion.   Lymphadenopathy:      Cervical: No cervical adenopathy.   Skin:     General: Skin is warm and dry.      Findings: No rash.   Neurological:      General: No focal deficit present.      Mental Status: She is alert. Mental status is at baseline.   Psychiatric:         Mood and Affect: Mood normal.         Behavior: Behavior normal.         Assessment/Plan   1. Dizziness        2. Excessive anger  amitriptyline (Elavil) 10 mg tablet    TSH with reflex to Free T4 if abnormal      3. Anxiety  amitriptyline (Elavil) 10 mg tablet    busPIRone (Buspar) 5 mg tablet      4. Insomnia, unspecified type  amitriptyline (Elavil) 10 mg tablet      5. Migraine without status migrainosus, not intractable, unspecified migraine type        6. Nausea  ondansetron (Zofran) 4 mg tablet      7. Rheumatoid arthritis involving multiple sites with positive rheumatoid factor (CMS/HCC)        8. Hypotensive episode        9. Vitamin B12 deficiency          TEST RESULTS WERE DISCUSSED.  EXPLAINED THE DIFFERENTIAL DX OF DIZZINESS. EXPLAINED HYPOTENSION CAN CAUSE POSITIONAL DIZZINESS  . ADVISED TO ADD MORE SALT TO DIET AND DRINK MORE WATER AND TO MONITOR THE BP.  ADVISED TO HAVE LOW FAT AND LOW CALORIE DIET AND TO LOOSE WEIGHT, DAILY EXERCISE. ADVISED TO TRY GLUTEN FREE DIET TO HELP WITH RA AND MIGRAINE.  ADVISED FOR RELAXATION TECHNIQUES AND TO CUT DOWN ON CAFFEINE.  OFFERED REFERRAL FOR COUNSELING , PT REFUSED .  ALL ASSESSED CHRONIC CONDITIONS ARE STABLE OR ADDRESSED.  EXPLAINED THE B12 DEFICIENCY . PT WANTS IT TO GET ADDRESSED BY RHEUMATOLOGIST.  OFFERED TO START MECLIZINE PRN FOR DIZZINESS, PT REFUSED.  MDM    1) COMPLEXITY: 1 UNDIAGNOSED NEW PROBLEM  WITH UNCERTAIN PROGNOSIS  2)DATA: TESTS INTERPRETED AND OR ORDERED, TOOK INDEPENDENT HISTORY OR RECORDS REVIEWED  3)RISK: MODERATE RISK DUE TO NATURE OF MEDICAL CONDITIONS/COMORBIDITY OR MEDICATIONS ORDERED OR SURGICAL OR PROCEDURE REFERRAL, .       2 weeks

## 2023-10-27 ENCOUNTER — LAB (OUTPATIENT)
Dept: LAB | Facility: LAB | Age: 28
End: 2023-10-27
Payer: COMMERCIAL

## 2023-10-27 DIAGNOSIS — R45.4 EXCESSIVE ANGER: ICD-10-CM

## 2023-10-27 LAB — TSH SERPL-ACNC: 0.82 MIU/L (ref 0.44–3.98)

## 2023-10-27 PROCEDURE — 84443 ASSAY THYROID STIM HORMONE: CPT

## 2023-10-27 PROCEDURE — 36415 COLL VENOUS BLD VENIPUNCTURE: CPT

## 2023-11-02 ENCOUNTER — OFFICE VISIT (OUTPATIENT)
Dept: PRIMARY CARE | Facility: CLINIC | Age: 28
End: 2023-11-02
Payer: COMMERCIAL

## 2023-11-02 VITALS
WEIGHT: 135 LBS | HEART RATE: 84 BPM | BODY MASS INDEX: 25.49 KG/M2 | SYSTOLIC BLOOD PRESSURE: 106 MMHG | DIASTOLIC BLOOD PRESSURE: 74 MMHG | HEIGHT: 61 IN

## 2023-11-02 DIAGNOSIS — R10.2 ABDOMINAL PAIN, SUPRAPUBIC: ICD-10-CM

## 2023-11-02 DIAGNOSIS — R35.0 URINE FREQUENCY: Primary | ICD-10-CM

## 2023-11-02 DIAGNOSIS — N30.00 ACUTE CYSTITIS WITHOUT HEMATURIA: ICD-10-CM

## 2023-11-02 DIAGNOSIS — R42 DIZZINESS: ICD-10-CM

## 2023-11-02 DIAGNOSIS — E53.8 VITAMIN B12 DEFICIENCY: ICD-10-CM

## 2023-11-02 DIAGNOSIS — I95.9 HYPOTENSIVE EPISODE: ICD-10-CM

## 2023-11-02 DIAGNOSIS — D64.9 ANEMIA, UNSPECIFIED TYPE: ICD-10-CM

## 2023-11-02 LAB
POC APPEARANCE, URINE: CLEAR
POC BILIRUBIN, URINE: NEGATIVE
POC BLOOD, URINE: NEGATIVE
POC COLOR, URINE: YELLOW
POC GLUCOSE, URINE: NEGATIVE MG/DL
POC KETONES, URINE: NEGATIVE MG/DL
POC LEUKOCYTES, URINE: ABNORMAL
POC NITRITE,URINE: NEGATIVE
POC PH, URINE: 6 PH
POC PROTEIN, URINE: NEGATIVE MG/DL
POC SPECIFIC GRAVITY, URINE: 1.01
POC UROBILINOGEN, URINE: 0.2 EU/DL

## 2023-11-02 PROCEDURE — 96372 THER/PROPH/DIAG INJ SC/IM: CPT | Performed by: INTERNAL MEDICINE

## 2023-11-02 PROCEDURE — 81003 URINALYSIS AUTO W/O SCOPE: CPT | Performed by: INTERNAL MEDICINE

## 2023-11-02 PROCEDURE — 99214 OFFICE O/P EST MOD 30 MIN: CPT | Performed by: INTERNAL MEDICINE

## 2023-11-02 PROCEDURE — 87086 URINE CULTURE/COLONY COUNT: CPT

## 2023-11-02 PROCEDURE — 1036F TOBACCO NON-USER: CPT | Performed by: INTERNAL MEDICINE

## 2023-11-02 RX ORDER — CYANOCOBALAMIN 1000 UG/ML
1000 INJECTION, SOLUTION INTRAMUSCULAR; SUBCUTANEOUS ONCE
Status: COMPLETED | OUTPATIENT
Start: 2023-11-02 | End: 2023-11-02

## 2023-11-02 RX ORDER — PHENAZOPYRIDINE HYDROCHLORIDE 200 MG/1
200 TABLET, FILM COATED ORAL 3 TIMES DAILY PRN
Qty: 9 TABLET | Refills: 0 | Status: SHIPPED | OUTPATIENT
Start: 2023-11-02 | End: 2023-11-05

## 2023-11-02 RX ORDER — CIPROFLOXACIN 250 MG/1
250 TABLET, FILM COATED ORAL 2 TIMES DAILY
Qty: 10 TABLET | Refills: 0 | Status: SHIPPED | OUTPATIENT
Start: 2023-11-02 | End: 2023-11-07

## 2023-11-02 RX ADMIN — CYANOCOBALAMIN 1000 MCG: 1000 INJECTION, SOLUTION INTRAMUSCULAR; SUBCUTANEOUS at 11:07

## 2023-11-02 ASSESSMENT — ENCOUNTER SYMPTOMS
NAUSEA: 0
MUSCULOSKELETAL NEGATIVE: 1
GASTROINTESTINAL NEGATIVE: 1
CARDIOVASCULAR NEGATIVE: 1
CHILLS: 0
VOMITING: 0
WEAKNESS: 0
ENDOCRINE NEGATIVE: 1
RHINORRHEA: 0
PSYCHIATRIC NEGATIVE: 1
WHEEZING: 0
PALPITATIONS: 0
AGITATION: 0
DIARRHEA: 0
ABDOMINAL PAIN: 0
CONSTIPATION: 0
SHORTNESS OF BREATH: 0
FEVER: 0
BACK PAIN: 0
LIGHT-HEADEDNESS: 0
COUGH: 0
FREQUENCY: 1
EYES NEGATIVE: 1
ABDOMINAL DISTENTION: 0
HEADACHES: 0
NEUROLOGICAL NEGATIVE: 1
DYSURIA: 0
DIZZINESS: 0

## 2023-11-02 NOTE — PROGRESS NOTES
Subjective   Patient ID: Marisabel Cowan is a 28 y.o. female who presents for Follow-up (2 WEEK F/U LABS + MED CHECK. STILL HAS DIZZINESS OFF/ON. ANXIETY AND MOOD HAS IMPROVED. ).  HPI  Lab F/U. ELAVIL HELPS WITH ANXIETY, MIGRAINE AND INSOMNIA .POSITIONAL DIZZINESS ON AND OFF WITH HAVING HYPOTENSION. URINE FREQUENCY ABD BLADDER PRESSURE X 2-3 DAYS WITH HAVING H/O UTI.  Review of Systems   Constitutional:  Negative for chills and fever.   HENT: Negative.  Negative for congestion, postnasal drip and rhinorrhea.    Eyes: Negative.  Negative for visual disturbance.   Respiratory:  Negative for cough, shortness of breath and wheezing.    Cardiovascular: Negative.  Negative for chest pain, palpitations and leg swelling.   Gastrointestinal: Negative.  Negative for abdominal distention, abdominal pain, constipation, diarrhea, nausea and vomiting.   Endocrine: Negative.    Genitourinary:  Positive for frequency. Negative for dysuria and urgency.        BLADDER PRESSURE   Musculoskeletal: Negative.  Negative for back pain.   Skin: Negative.  Negative for rash.   Allergic/Immunologic: Negative for immunocompromised state.   Neurological: Negative.  Negative for dizziness, weakness, light-headedness and headaches.   Psychiatric/Behavioral: Negative.  Negative for agitation.        Objective   Physical Exam  Constitutional:       General: She is not in acute distress.  HENT:      Head: Normocephalic.      Nose: Nose normal.      Mouth/Throat:      Mouth: Mucous membranes are moist.   Eyes:      Conjunctiva/sclera: Conjunctivae normal.      Pupils: Pupils are equal, round, and reactive to light.   Cardiovascular:      Rate and Rhythm: Normal rate and regular rhythm.      Pulses: Normal pulses.      Heart sounds: Normal heart sounds.   Pulmonary:      Effort: No respiratory distress.      Breath sounds: No wheezing.   Chest:      Chest wall: No tenderness.   Abdominal:      General: Abdomen is flat. Bowel sounds are normal.       Palpations: Abdomen is soft.      Tenderness: There is abdominal tenderness.      Comments: Suprapubic tenderness   Musculoskeletal:         General: No tenderness. Normal range of motion.      Cervical back: Normal range of motion.   Lymphadenopathy:      Cervical: No cervical adenopathy.   Skin:     General: Skin is warm and dry.      Findings: No rash.   Neurological:      General: No focal deficit present.      Mental Status: She is alert. Mental status is at baseline.   Psychiatric:         Mood and Affect: Mood normal.         Behavior: Behavior normal.         Assessment/Plan   1. Urine frequency  Urine Culture    POCT UA Automated manually resulted    Urine Culture      2. Abdominal pain, suprapubic  Urine Culture    POCT UA Automated manually resulted    Urine Culture    phenazopyridine (Pyridium) 200 mg tablet      3. Vitamin B12 deficiency  cyanocobalamin (Vitamin B-12) injection 1,000 mcg    Vitamin B12      4. Acute cystitis without hematuria  ciprofloxacin (Cipro) 250 mg tablet      5. Anemia, unspecified type  CBC and Auto Differential      6. Hypotensive episode        7. Dizziness          Advised to avoid holding the urine and to go on time to bathroom .  ADVISED TO CUT DOWN CAFFEINE.  ADVISED TO HAVE LOW FAT AND LOW CALORIE ,HIGH B12 DIET AND TO LOOSE WEIGHT, DAILY EXERCISE.  ADVISED TO ADD MORE SALT TO DIET AND TO DRINK MORE WATER WITH HAVING LOW BP AND DIZZINESS     MDM    1) COMPLEXITY: 1 UNDIAGNOSED NEW PROBLEM WITH UNCERTAIN PROGNOSIS  2)DATA: TESTS INTERPRETED AND OR ORDERED, TOOK INDEPENDENT HISTORY OR RECORDS REVIEWED  3)RISK: MODERATE RISK DUE TO NATURE OF MEDICAL CONDITIONS/COMORBIDITY OR MEDICATIONS ORDERED OR SURGICAL OR PROCEDURE REFERRAL, .       4 mon

## 2023-11-04 LAB — BACTERIA UR CULT: NORMAL

## 2023-12-07 ENCOUNTER — OFFICE VISIT (OUTPATIENT)
Dept: PRIMARY CARE | Facility: CLINIC | Age: 28
End: 2023-12-07
Payer: COMMERCIAL

## 2023-12-07 VITALS
HEIGHT: 61 IN | DIASTOLIC BLOOD PRESSURE: 66 MMHG | HEART RATE: 84 BPM | SYSTOLIC BLOOD PRESSURE: 104 MMHG | WEIGHT: 131 LBS | BODY MASS INDEX: 24.73 KG/M2

## 2023-12-07 DIAGNOSIS — E53.8 VITAMIN B12 DEFICIENCY: ICD-10-CM

## 2023-12-07 DIAGNOSIS — L29.9 PRURITUS: ICD-10-CM

## 2023-12-07 DIAGNOSIS — M06.9 RHEUMATOID ARTHRITIS OF OTHER SITE, UNSPECIFIED WHETHER RHEUMATOID FACTOR PRESENT (MULTI): ICD-10-CM

## 2023-12-07 DIAGNOSIS — B37.31 VAGINA, CANDIDIASIS: Primary | ICD-10-CM

## 2023-12-07 PROCEDURE — 99214 OFFICE O/P EST MOD 30 MIN: CPT | Performed by: INTERNAL MEDICINE

## 2023-12-07 PROCEDURE — 1036F TOBACCO NON-USER: CPT | Performed by: INTERNAL MEDICINE

## 2023-12-07 PROCEDURE — 96372 THER/PROPH/DIAG INJ SC/IM: CPT | Performed by: INTERNAL MEDICINE

## 2023-12-07 RX ORDER — CYANOCOBALAMIN 1000 UG/ML
1000 INJECTION, SOLUTION INTRAMUSCULAR; SUBCUTANEOUS ONCE
Status: COMPLETED | OUTPATIENT
Start: 2023-12-07 | End: 2023-12-07

## 2023-12-07 RX ORDER — FLUCONAZOLE 150 MG/1
150 TABLET ORAL DAILY
Qty: 5 TABLET | Refills: 0 | Status: SHIPPED | OUTPATIENT
Start: 2023-12-07 | End: 2023-12-14 | Stop reason: ALTCHOICE

## 2023-12-07 RX ORDER — CLOTRIMAZOLE AND BETAMETHASONE DIPROPIONATE 10; .64 MG/G; MG/G
1 CREAM TOPICAL 2 TIMES DAILY PRN
Qty: 45 G | Refills: 3 | Status: SHIPPED | OUTPATIENT
Start: 2023-12-07

## 2023-12-07 RX ADMIN — CYANOCOBALAMIN 1000 MCG: 1000 INJECTION, SOLUTION INTRAMUSCULAR; SUBCUTANEOUS at 09:25

## 2023-12-07 ASSESSMENT — ENCOUNTER SYMPTOMS
CONSTIPATION: 0
GASTROINTESTINAL NEGATIVE: 1
WEAKNESS: 0
FEVER: 0
DIARRHEA: 0
ABDOMINAL PAIN: 0
VOMITING: 0
DIZZINESS: 0
NEUROLOGICAL NEGATIVE: 1
ABDOMINAL DISTENTION: 0
DYSURIA: 0
RHINORRHEA: 0
HEADACHES: 0
BACK PAIN: 0
PSYCHIATRIC NEGATIVE: 1
CARDIOVASCULAR NEGATIVE: 1
WHEEZING: 0
EYES NEGATIVE: 1
NAUSEA: 0
MUSCULOSKELETAL NEGATIVE: 1
PALPITATIONS: 0
CHILLS: 0
ENDOCRINE NEGATIVE: 1
SHORTNESS OF BREATH: 0
COUGH: 0
LIGHT-HEADEDNESS: 0
AGITATION: 0

## 2023-12-07 NOTE — PROGRESS NOTES
Subjective   Patient ID: Marisabel Cowan is a 28 y.o. female who presents for Follow-up (C/O WHITE VAGINAL DISCHARGE + ITCHING. REQUESTING TO BEGIN SELF-INJECTING VITAMIN B12 AND WANTING RX SENT TO PHARMACY. ).  HPI  Whitish vaginal discharge with vaginal itching X 2 DAYS . NO URINE COMPLAINTS.B12 DEFICIENCY ,  PT IS OK TO GET THE MONTHLY B12 INJECTION AT THE OFFICE. NEEDS IT TODAY. RA IS STABLE WITH TX. CURRENTLY IS NOT ON PREDNISONE (TAKES IT PRN).  Review of Systems   Constitutional:  Negative for chills and fever.   HENT: Negative.  Negative for congestion, postnasal drip and rhinorrhea.    Eyes: Negative.  Negative for visual disturbance.   Respiratory:  Negative for cough, shortness of breath and wheezing.    Cardiovascular: Negative.  Negative for chest pain, palpitations and leg swelling.   Gastrointestinal: Negative.  Negative for abdominal distention, abdominal pain, constipation, diarrhea, nausea and vomiting.   Endocrine: Negative.    Genitourinary:  Negative for dysuria and urgency.        PER HPI   Musculoskeletal: Negative.  Negative for back pain.   Skin: Negative.  Negative for rash.   Allergic/Immunologic: Negative for immunocompromised state.   Neurological: Negative.  Negative for dizziness, weakness, light-headedness and headaches.   Psychiatric/Behavioral: Negative.  Negative for agitation.        Objective   Physical Exam  Constitutional:       General: She is not in acute distress.  HENT:      Head: Normocephalic.      Nose: Nose normal.      Mouth/Throat:      Mouth: Mucous membranes are moist.   Eyes:      Conjunctiva/sclera: Conjunctivae normal.      Pupils: Pupils are equal, round, and reactive to light.   Cardiovascular:      Rate and Rhythm: Normal rate and regular rhythm.      Pulses: Normal pulses.      Heart sounds: Normal heart sounds.   Pulmonary:      Effort: No respiratory distress.      Breath sounds: No wheezing.   Chest:      Chest wall: No tenderness.   Abdominal:       General: Abdomen is flat. Bowel sounds are normal.      Palpations: Abdomen is soft.      Tenderness: There is no abdominal tenderness.   Musculoskeletal:         General: No tenderness. Normal range of motion.      Cervical back: Normal range of motion.   Lymphadenopathy:      Cervical: No cervical adenopathy.   Skin:     General: Skin is warm and dry.      Findings: No rash.   Neurological:      General: No focal deficit present.      Mental Status: She is alert. Mental status is at baseline.   Psychiatric:         Mood and Affect: Mood normal.         Behavior: Behavior normal.         Assessment/Plan   1. Vagina, candidiasis  fluconazole (Diflucan) 150 mg tablet    clotrimazole-betamethasone (Lotrisone) cream      2. Pruritus  fluconazole (Diflucan) 150 mg tablet    clotrimazole-betamethasone (Lotrisone) cream      3. Vitamin B12 deficiency  cyanocobalamin (Vitamin B-12) injection 1,000 mcg      4. Rheumatoid arthritis of other site, unspecified whether rheumatoid factor present (CMS/[facility])          ADVISED TO HAVE LOW FAT AND LOW CALORIE DIET ,DAILY EXERCISE. ADVISED TO FOLLOW THE ANTI-INFLAMMATORY DIET/ GLUTEN FREE DIET WITH HAVING RA .  ADVISED TO CHANGE THE UNDERWEAR FREQUENTLY WITH HAVING VAGINAL CANDIDIASIS AND DISCHARGE.    MDM    1) COMPLEXITY: 1 UNDIAGNOSED NEW PROBLEM WITH UNCERTAIN PROGNOSIS  2)DATA: TESTS INTERPRETED AND OR ORDERED, TOOK INDEPENDENT HISTORY OR RECORDS REVIEWED  3)RISK: MODERATE RISK DUE TO NATURE OF MEDICAL CONDITIONS/COMORBIDITY OR MEDICATIONS ORDERED OR SURGICAL OR PROCEDURE REFERRAL, .     Has F/U.

## 2023-12-11 ENCOUNTER — TELEPHONE (OUTPATIENT)
Dept: PRIMARY CARE | Facility: CLINIC | Age: 28
End: 2023-12-11
Payer: COMMERCIAL

## 2023-12-11 NOTE — TELEPHONE ENCOUNTER
YOU SAW PT ON 12/7/23 FOR A YEAST INF. TODAY WAS LAST DOSE OF DIFLUCAN AND SHE IS NOT ANY BETTER . PLEASE ADVISE

## 2023-12-12 DIAGNOSIS — B37.31 VAGINA, CANDIDIASIS: Primary | ICD-10-CM

## 2023-12-12 RX ORDER — FLUCONAZOLE 150 MG/1
150 TABLET ORAL DAILY
Qty: 5 TABLET | Refills: 0 | Status: SHIPPED | OUTPATIENT
Start: 2023-12-12 | End: 2023-12-14 | Stop reason: ALTCHOICE

## 2023-12-14 ENCOUNTER — OFFICE VISIT (OUTPATIENT)
Dept: PRIMARY CARE | Facility: CLINIC | Age: 28
End: 2023-12-14
Payer: COMMERCIAL

## 2023-12-14 VITALS
SYSTOLIC BLOOD PRESSURE: 104 MMHG | OXYGEN SATURATION: 100 % | HEIGHT: 61 IN | DIASTOLIC BLOOD PRESSURE: 74 MMHG | BODY MASS INDEX: 25.49 KG/M2 | HEART RATE: 80 BPM | WEIGHT: 135 LBS

## 2023-12-14 DIAGNOSIS — G47.00 INSOMNIA, UNSPECIFIED TYPE: ICD-10-CM

## 2023-12-14 DIAGNOSIS — R35.0 URINE FREQUENCY: Primary | ICD-10-CM

## 2023-12-14 DIAGNOSIS — F41.9 ANXIETY: ICD-10-CM

## 2023-12-14 DIAGNOSIS — M06.9 RHEUMATOID ARTHRITIS OF OTHER SITE, UNSPECIFIED WHETHER RHEUMATOID FACTOR PRESENT (MULTI): ICD-10-CM

## 2023-12-14 DIAGNOSIS — N39.0 RECURRENT UTI: ICD-10-CM

## 2023-12-14 DIAGNOSIS — N39.0 URINARY TRACT INFECTION WITHOUT HEMATURIA, SITE UNSPECIFIED: ICD-10-CM

## 2023-12-14 DIAGNOSIS — E53.8 VITAMIN B12 DEFICIENCY: ICD-10-CM

## 2023-12-14 LAB
POC APPEARANCE, URINE: ABNORMAL
POC BILIRUBIN, URINE: NEGATIVE
POC BLOOD, URINE: NEGATIVE
POC COLOR, URINE: YELLOW
POC GLUCOSE, URINE: NEGATIVE MG/DL
POC KETONES, URINE: NEGATIVE MG/DL
POC LEUKOCYTES, URINE: ABNORMAL
POC NITRITE,URINE: NEGATIVE
POC PH, URINE: 7 PH
POC PROTEIN, URINE: NEGATIVE MG/DL
POC SPECIFIC GRAVITY, URINE: 1.01
POC UROBILINOGEN, URINE: 0.2 EU/DL

## 2023-12-14 PROCEDURE — 1036F TOBACCO NON-USER: CPT | Performed by: NURSE PRACTITIONER

## 2023-12-14 PROCEDURE — 81003 URINALYSIS AUTO W/O SCOPE: CPT | Performed by: NURSE PRACTITIONER

## 2023-12-14 PROCEDURE — 87086 URINE CULTURE/COLONY COUNT: CPT

## 2023-12-14 PROCEDURE — 99214 OFFICE O/P EST MOD 30 MIN: CPT | Performed by: NURSE PRACTITIONER

## 2023-12-14 RX ORDER — FERROUS SULFATE, DRIED 160(50) MG
1 TABLET, EXTENDED RELEASE ORAL 2 TIMES DAILY
Qty: 180 TABLET | Refills: 3 | Status: SHIPPED | OUTPATIENT
Start: 2023-12-14

## 2023-12-14 RX ORDER — AMITRIPTYLINE HYDROCHLORIDE 25 MG/1
25 TABLET, FILM COATED ORAL NIGHTLY
Qty: 90 TABLET | Refills: 0 | Status: SHIPPED | OUTPATIENT
Start: 2023-12-14 | End: 2024-03-21 | Stop reason: ALTCHOICE

## 2023-12-14 RX ORDER — FLUCONAZOLE 150 MG/1
TABLET ORAL
Qty: 3 TABLET | Refills: 0 | Status: SHIPPED | OUTPATIENT
Start: 2023-12-14

## 2023-12-14 RX ORDER — CELECOXIB 200 MG/1
200 CAPSULE ORAL 2 TIMES DAILY
Qty: 180 CAPSULE | Refills: 1 | Status: SHIPPED | OUTPATIENT
Start: 2023-12-14 | End: 2024-03-21 | Stop reason: ALTCHOICE

## 2023-12-14 RX ORDER — BUSPIRONE HYDROCHLORIDE 15 MG/1
15 TABLET ORAL 3 TIMES DAILY PRN
Qty: 90 TABLET | Refills: 1 | Status: SHIPPED | OUTPATIENT
Start: 2023-12-14 | End: 2024-02-13

## 2023-12-14 RX ORDER — NITROFURANTOIN 25; 75 MG/1; MG/1
100 CAPSULE ORAL 2 TIMES DAILY
Qty: 14 CAPSULE | Refills: 0 | Status: SHIPPED | OUTPATIENT
Start: 2023-12-14 | End: 2023-12-21

## 2023-12-14 ASSESSMENT — ENCOUNTER SYMPTOMS
VOMITING: 0
CHEST TIGHTNESS: 0
DIZZINESS: 0
MYALGIAS: 0
CONSTIPATION: 0
SEIZURES: 0
NERVOUS/ANXIOUS: 1
HEADACHES: 1
SHORTNESS OF BREATH: 0
WOUND: 0
SLEEP DISTURBANCE: 1
SORE THROAT: 0
FREQUENCY: 1
BLOOD IN STOOL: 0
UNEXPECTED WEIGHT CHANGE: 0
FEVER: 0
COUGH: 0
JOINT SWELLING: 0
DYSURIA: 0
HEMATURIA: 0
WEAKNESS: 0
PHOTOPHOBIA: 0
ABDOMINAL DISTENTION: 0
SPEECH DIFFICULTY: 0
ABDOMINAL PAIN: 0
NUMBNESS: 0
EYE REDNESS: 0
ARTHRALGIAS: 1
DIARRHEA: 0
PALPITATIONS: 0
CONFUSION: 0
TROUBLE SWALLOWING: 0
CHOKING: 0
APNEA: 0
BACK PAIN: 0
DIFFICULTY URINATING: 0
FATIGUE: 0
WHEEZING: 0
NAUSEA: 0
CHILLS: 0
EYE PAIN: 0
FLANK PAIN: 0
NECK PAIN: 0
FACIAL ASYMMETRY: 0

## 2023-12-14 ASSESSMENT — PATIENT HEALTH QUESTIONNAIRE - PHQ9
1. LITTLE INTEREST OR PLEASURE IN DOING THINGS: NOT AT ALL
SUM OF ALL RESPONSES TO PHQ9 QUESTIONS 1 AND 2: 0
2. FEELING DOWN, DEPRESSED OR HOPELESS: NOT AT ALL

## 2023-12-14 NOTE — PROGRESS NOTES
"Subjective   Patient ID: Marisabel Cowan is a 28 y.o. female who presents for Urinary Frequency.      HPI:  Presents today for C/O URINARY FREQUENCY X 2-3 DAYS   modifying factors consists of HAS RE-OCCURRING UTI'S  FOR MULTIPLE YEARS associated symptoms consist of NO PAIN OR HEMATURIA  prior treatment consists of medication CIPRO LAST MONTH       ANXIETY- REMAINS.  INCREASE BUSPAR TO 15 MG TID PRN  RA- FOLLOWS WITH DR. GARCIA, [facility]HE[facility]. INCREASED PAIN. WILL START CELEBREX AND INSTRUCTED TO DISCUSS FURTHER WITH HER  INSOMNIA/HA REMAINS. INCREASE AMITRIPTYLINE TO 25 MG FROM 10 MG      Visit Vitals  /74 (BP Location: Right arm, Patient Position: Sitting)   Pulse 80   Ht 1.549 m (5' 1\")   Wt 61.2 kg (135 lb)   SpO2 100%   BMI 25.51 kg/m²   OB Status Having periods   Smoking Status Never   BSA 1.62 m²        Review of Systems   Constitutional:  Negative for chills, fatigue, fever and unexpected weight change.   HENT:  Negative for congestion, ear pain, sore throat and trouble swallowing.    Eyes:  Negative for photophobia, pain, redness and visual disturbance.   Respiratory:  Negative for apnea, cough, choking, chest tightness, shortness of breath and wheezing.    Cardiovascular:  Negative for chest pain, palpitations and leg swelling.   Gastrointestinal:  Negative for abdominal distention, abdominal pain, blood in stool, constipation, diarrhea, nausea and vomiting.   Genitourinary:  Positive for frequency. Negative for difficulty urinating, dysuria, flank pain, hematuria and urgency.   Musculoskeletal:  Positive for arthralgias. Negative for back pain, gait problem, joint swelling, myalgias and neck pain.   Skin:  Negative for rash and wound.   Neurological:  Positive for headaches. Negative for dizziness, seizures, syncope, facial asymmetry, speech difficulty, weakness and numbness.   Psychiatric/Behavioral:  Positive for sleep disturbance. Negative for confusion and suicidal ideas. The patient is nervous/anxious. "        Objective    SEE IO UA    Physical Exam  Constitutional:       Appearance: Normal appearance. She is normal weight.   HENT:      Head: Normocephalic.   Eyes:      Extraocular Movements: Extraocular movements intact.      Conjunctiva/sclera: Conjunctivae normal.      Pupils: Pupils are equal, round, and reactive to light.   Cardiovascular:      Rate and Rhythm: Normal rate and regular rhythm.      Pulses: Normal pulses.      Heart sounds: Normal heart sounds.   Pulmonary:      Effort: Pulmonary effort is normal.      Breath sounds: Normal breath sounds.   Musculoskeletal:         General: Normal range of motion.      Cervical back: Normal range of motion.   Skin:     General: Skin is warm and dry.   Neurological:      General: No focal deficit present.      Mental Status: She is alert and oriented to person, place, and time.   Psychiatric:         Mood and Affect: Mood normal.         Behavior: Behavior normal.         Thought Content: Thought content normal.         Judgment: Judgment normal.            Assessment/Plan   Problem List Items Addressed This Visit       Rheumatoid arthritis (CMS/HCC)    Relevant Medications    celecoxib (CeleBREX) 200 mg capsule    calcium carbonate-vitamin D3 (Hi-Gunner Plus Vit D) 500 mg-5 mcg (200 unit) tablet    Other Relevant Orders    CBC and Auto Differential    Comprehensive Metabolic Panel    Hemoglobin A1C    Lipid Panel    TSH with reflex to Free T4 if abnormal    Vitamin B12    Vitamin D 25-Hydroxy,Total (for eval of Vitamin D levels)    Anxiety    Relevant Medications    busPIRone (Buspar) 15 mg tablet    Other Relevant Orders    CBC and Auto Differential    Comprehensive Metabolic Panel    Hemoglobin A1C    Lipid Panel    TSH with reflex to Free T4 if abnormal    Vitamin B12    Vitamin D 25-Hydroxy,Total (for eval of Vitamin D levels)    Insomnia    Relevant Medications    amitriptyline (Elavil) 25 mg tablet    Vitamin B12 deficiency    Relevant Orders    CBC and Auto  Differential    Comprehensive Metabolic Panel    Hemoglobin A1C    Lipid Panel    TSH with reflex to Free T4 if abnormal    Vitamin B12    Vitamin D 25-Hydroxy,Total (for eval of Vitamin D levels)    Urine frequency - Primary    Relevant Orders    POCT UA Automated manually resulted (Completed)    Urine Culture    Recurrent UTI    Relevant Orders    Referral to Urogynecology    Urinary tract infection without hematuria    Relevant Medications    fluconazole (Diflucan) 150 mg tablet    nitrofurantoin, macrocrystal-monohydrate, (Macrobid) 100 mg capsule       WE DISCUSSED MOST COMMON SIDE EFFECTS OF PRESCRIBED MEDICATIONS. INDICATIONS, RISK, COMPLICATIONS, AND ALTERNATIVES OF MEDICATION/THERAPEUTICS WERE EXPLAINED AND DISCUSSED. PLEASE MONITOR CLOSELY FOR ANY UNTOWARD SIDE EFFECTS OR COMPLICATIONS OF MEDICATIONS. PATIENT IS STRONGLY ADVISED TO BE COMPLIANT WITH RECOMMENDATIONS. QUESTIONS AND CONCERNS WERE ADDRESSED. INSTRUCTED TO CALL, RETURN SOONER, OR GO TO THE ER,  IF SYMPTOMS PERSIST OR WORSEN. THEY VOICED UNDERSTANDING AND  DENIES FURTHER QUESTIONS AT THIS TIME.    TIME CODE  1. PREPARATION FOR PATIENT'S VISIT (REVIEWING CHART, CURRENT MEDICAL RECORDS, OUTSIDE HEALTH PROVIDER RECORDS, PREVIOUS HISTORY, EXAM, TEST, PROCEDURE, AND MEDICATIONS)  2. FACE TO FACE ENCOUNTER OBTAINING HISTORY FROM THE PATIENT/FAMILY/CAREGIVERS; PERFORMING EVALUATION AND EXAMINATION; ORDERING TESTS OR PROCEDURES; REFERRING AND COMMUNICATING WITH OTHER HEALTHCARE PROVIDERS; COUNSELING AND EDUCATION OF THE PATIENT/FAMILY/CAREGIVERS; INDEPENDENTLY INTERPRETING RESULTS (TESTS, LABS, PROCEDURES, IMAGING) AND COMMUNICATING AND EXPLAINING RESULTS TO THE PATIENT/FAMILY/CAREGIVERS  3. COORDINATION OF CARE; PREPARING AND PRINTING DISCHARGE INSTRUCTIONS AND ANY EDUCATIONAL MATERIAL FOR THE PATIENT/FAMILY/CAREGIVERS. DOCUMENTING CLINICAL INFORMATION IN THE ELECTRONIC MEDICAL RECORD   4. REVIEWING OARRS AS NEEDED    1 MONTH

## 2023-12-16 LAB — BACTERIA UR CULT: NORMAL

## 2023-12-18 ENCOUNTER — LAB (OUTPATIENT)
Dept: LAB | Facility: LAB | Age: 28
End: 2023-12-18
Payer: COMMERCIAL

## 2023-12-18 DIAGNOSIS — D64.9 ANEMIA, UNSPECIFIED TYPE: ICD-10-CM

## 2023-12-18 DIAGNOSIS — E53.8 VITAMIN B12 DEFICIENCY: ICD-10-CM

## 2023-12-18 LAB
BASOPHILS # BLD AUTO: 0.03 X10*3/UL (ref 0–0.1)
BASOPHILS NFR BLD AUTO: 0.3 %
EOSINOPHIL # BLD AUTO: 0.16 X10*3/UL (ref 0–0.7)
EOSINOPHIL NFR BLD AUTO: 1.9 %
ERYTHROCYTE [DISTWIDTH] IN BLOOD BY AUTOMATED COUNT: 12.2 % (ref 11.5–14.5)
HCT VFR BLD AUTO: 38.9 % (ref 36–46)
HGB BLD-MCNC: 12.2 G/DL (ref 12–16)
IMM GRANULOCYTES # BLD AUTO: 0.03 X10*3/UL (ref 0–0.7)
IMM GRANULOCYTES NFR BLD AUTO: 0.3 % (ref 0–0.9)
LYMPHOCYTES # BLD AUTO: 2.7 X10*3/UL (ref 1.2–4.8)
LYMPHOCYTES NFR BLD AUTO: 31.3 %
MCH RBC QN AUTO: 29.1 PG (ref 26–34)
MCHC RBC AUTO-ENTMCNC: 31.4 G/DL (ref 32–36)
MCV RBC AUTO: 93 FL (ref 80–100)
MONOCYTES # BLD AUTO: 0.68 X10*3/UL (ref 0.1–1)
MONOCYTES NFR BLD AUTO: 7.9 %
NEUTROPHILS # BLD AUTO: 5.04 X10*3/UL (ref 1.2–7.7)
NEUTROPHILS NFR BLD AUTO: 58.3 %
NRBC BLD-RTO: 0 /100 WBCS (ref 0–0)
PLATELET # BLD AUTO: 263 X10*3/UL (ref 150–450)
RBC # BLD AUTO: 4.19 X10*6/UL (ref 4–5.2)
VIT B12 SERPL-MCNC: 302 PG/ML (ref 211–911)
WBC # BLD AUTO: 8.6 X10*3/UL (ref 4.4–11.3)

## 2023-12-18 PROCEDURE — 85025 COMPLETE CBC W/AUTO DIFF WBC: CPT

## 2023-12-18 PROCEDURE — 82607 VITAMIN B-12: CPT

## 2023-12-18 PROCEDURE — 36415 COLL VENOUS BLD VENIPUNCTURE: CPT

## 2023-12-19 DIAGNOSIS — Z79.899 OTHER LONG TERM (CURRENT) DRUG THERAPY: ICD-10-CM

## 2023-12-19 DIAGNOSIS — M06.4 INFLAMMATORY POLYARTHROPATHY (MULTI): Primary | ICD-10-CM

## 2023-12-20 ENCOUNTER — LAB (OUTPATIENT)
Dept: LAB | Facility: LAB | Age: 28
End: 2023-12-20
Payer: COMMERCIAL

## 2023-12-20 DIAGNOSIS — Z79.899 OTHER LONG TERM (CURRENT) DRUG THERAPY: ICD-10-CM

## 2023-12-20 DIAGNOSIS — M06.4 INFLAMMATORY POLYARTHROPATHY (MULTI): Primary | ICD-10-CM

## 2023-12-20 DIAGNOSIS — M06.4 INFLAMMATORY POLYARTHROPATHY (MULTI): ICD-10-CM

## 2023-12-20 LAB
ALBUMIN SERPL BCP-MCNC: 4.5 G/DL (ref 3.4–5)
ALP SERPL-CCNC: 72 U/L (ref 33–110)
ALT SERPL W P-5'-P-CCNC: 12 U/L (ref 7–45)
ANION GAP SERPL CALC-SCNC: 10 MMOL/L (ref 10–20)
AST SERPL W P-5'-P-CCNC: 17 U/L (ref 9–39)
BILIRUB SERPL-MCNC: 0.3 MG/DL (ref 0–1.2)
BUN SERPL-MCNC: 13 MG/DL (ref 6–23)
CALCIUM SERPL-MCNC: 9.4 MG/DL (ref 8.6–10.3)
CHLORIDE SERPL-SCNC: 106 MMOL/L (ref 98–107)
CO2 SERPL-SCNC: 28 MMOL/L (ref 21–32)
CREAT SERPL-MCNC: 0.74 MG/DL (ref 0.5–1.05)
GFR SERPL CREATININE-BSD FRML MDRD: >90 ML/MIN/1.73M*2
GLUCOSE SERPL-MCNC: 88 MG/DL (ref 74–99)
POTASSIUM SERPL-SCNC: 3.9 MMOL/L (ref 3.5–5.3)
PROT SERPL-MCNC: 7.2 G/DL (ref 6.4–8.2)
SODIUM SERPL-SCNC: 140 MMOL/L (ref 136–145)

## 2023-12-20 PROCEDURE — 80053 COMPREHEN METABOLIC PANEL: CPT

## 2023-12-20 PROCEDURE — 36415 COLL VENOUS BLD VENIPUNCTURE: CPT

## 2024-01-09 RX ORDER — DULOXETIN HYDROCHLORIDE 30 MG/1
CAPSULE, DELAYED RELEASE ORAL
COMMUNITY
Start: 2023-12-18 | End: 2024-03-21 | Stop reason: ALTCHOICE

## 2024-01-11 ENCOUNTER — APPOINTMENT (OUTPATIENT)
Dept: PRIMARY CARE | Facility: CLINIC | Age: 29
End: 2024-01-11
Payer: COMMERCIAL

## 2024-01-18 ENCOUNTER — APPOINTMENT (OUTPATIENT)
Dept: PRIMARY CARE | Facility: CLINIC | Age: 29
End: 2024-01-18
Payer: COMMERCIAL

## 2024-01-18 ENCOUNTER — OFFICE VISIT (OUTPATIENT)
Dept: OBSTETRICS AND GYNECOLOGY | Facility: CLINIC | Age: 29
End: 2024-01-18
Payer: COMMERCIAL

## 2024-01-18 VITALS
BODY MASS INDEX: 25.85 KG/M2 | SYSTOLIC BLOOD PRESSURE: 117 MMHG | WEIGHT: 136.8 LBS | DIASTOLIC BLOOD PRESSURE: 79 MMHG | HEART RATE: 77 BPM

## 2024-01-18 DIAGNOSIS — B37.9 YEAST INFECTION: Primary | ICD-10-CM

## 2024-01-18 DIAGNOSIS — N39.0 RECURRENT UTI: ICD-10-CM

## 2024-01-18 DIAGNOSIS — M06.9 RHEUMATOID ARTHRITIS OF OTHER SITE, UNSPECIFIED WHETHER RHEUMATOID FACTOR PRESENT (MULTI): ICD-10-CM

## 2024-01-18 PROCEDURE — 1036F TOBACCO NON-USER: CPT | Performed by: OBSTETRICS & GYNECOLOGY

## 2024-01-18 PROCEDURE — 99215 OFFICE O/P EST HI 40 MIN: CPT | Performed by: OBSTETRICS & GYNECOLOGY

## 2024-01-18 PROCEDURE — 81001 URINALYSIS AUTO W/SCOPE: CPT | Performed by: OBSTETRICS & GYNECOLOGY

## 2024-01-18 PROCEDURE — 99205 OFFICE O/P NEW HI 60 MIN: CPT | Performed by: OBSTETRICS & GYNECOLOGY

## 2024-01-18 PROCEDURE — 87086 URINE CULTURE/COLONY COUNT: CPT | Performed by: OBSTETRICS & GYNECOLOGY

## 2024-01-18 RX ORDER — METHENAMINE HIPPURATE 1000 MG/1
1 TABLET ORAL 2 TIMES DAILY
Qty: 180 EACH | Refills: 3 | Status: SHIPPED | OUTPATIENT
Start: 2024-01-18 | End: 2025-01-17

## 2024-01-18 ASSESSMENT — ENCOUNTER SYMPTOMS
GASTROINTESTINAL NEGATIVE: 1
NEUROLOGICAL NEGATIVE: 1
PSYCHIATRIC NEGATIVE: 1
CONSTITUTIONAL NEGATIVE: 1
CARDIOVASCULAR NEGATIVE: 1
RESPIRATORY NEGATIVE: 1
EYES NEGATIVE: 1
MUSCULOSKELETAL NEGATIVE: 1
ENDOCRINE NEGATIVE: 1

## 2024-01-18 ASSESSMENT — PAIN SCALES - GENERAL: PAINLEVEL: 0-NO PAIN

## 2024-01-18 NOTE — PROGRESS NOTES
[facility] Department of Urogynecology   Corry Cherry MD, MPH   [phone]    ASSESSMENT AND PLAN:   29 year old female with recurrent UTI and recurrent yeast vaginitis. Comorbidities include: Migraines, RA, and mixed IBS.     Diagnoses:  #1 Recurrent UTI  #2 Recurrent yeast vaginitis   #3 RA on immunosuppression    Plan:  1. Recurrent UTI  - POCT urine dip sent for microscopy with culture; not symptomatic of a UTI today.   - We discussed consideration of starting a UTI ppx regimen with taking daily Hiprex which is not an antibiotic. She is amenable to this plan.   - Sent Rx of Methenamine Hippurate 1g to her preferred pharmacy with instructions to take 1 pill po BID take with 500mg of vitamin C for UTI prophylaxis. This medication works to make the urine more acidic to prevent bacterial infections/UTI. We will plan to have her on this regimen to break the cycle of recurrent UTI for 6 months then re-evaluate if we should continue for another 6 months or discontinue Hiprex. If she were to get a UTI she will discontinue Hiprex, take the antibiotic, and then restart the Hiprex.   - Placed standing urine culture orders.   - Encouraged her to call our office and leave a urine sample at any  lab when she feels symptomatic of a UTI in the future so we can result it and treat her accordingly as overuse of antibiotics this puts the patient at risk for developing antibiotic-resistant infections and recurrent yeast infections from antibiotic overuse. Will plan to only sent antibiotics for positive cultures and when she is symptomatic of a UTI.     2. Recurrent vaginitis, yeast infections  - We discussed that her recurrent yeast infections are likely due to being on antibiotics for presumed UTI (12x per year) and this is further complicated by her RA which lowers her autoimmune response and ability to fight infections.   - Advised her to restart her RA medication to help further strengthen her immune system.   -  If she were to continue getting recurrent yeast infections we may consider placing her on a preventative regimen of a weekly Diflucan x6 months.   - We discussed that if she feels symptomatic of a yeast infection in the future to RTC to see us or Jammie Melton CNP to collect an e-swab to assess for presence of yeast to ensure we are treating her correctly.     Follow up in 3 months via phone call visit with Dr. Corry Cherry.     Scribe Attestation  By signing my name below, I, Trell Rasheed, Dipti, attest that this documentation has been prepared under the direction and in the presence of Corry Cherry MD MPH on 01/18/2024 at 6:22 PM.     Problem List Items Addressed This Visit          Genitourinary and Reproductive    Recurrent UTI    Relevant Medications    methenamine hippurate (Hiprex) 1 gram tablet    Other Relevant Orders    Urinalysis with Reflex Culture and Microscopic      I spent a total of 45 minutes in face to face and non face to face time.      Corry Cherry MD, MPH, FACOG   I Dr. Cherry, personally performed the services described in the documentation as scribed in my presence and confirm it is both complete and accurate.  Corry Cherry MD, MPH, FACOG      New    HISTORY OF PRESENT ILLNESS:   29 year old female presenting as a new patient evaluation for recurrent UTI and yeast infections over the past 7 years.     Record Review:   - December 2023 - treated with Diflucan for presumed yeast infection. She was also tx with Clotrimazole-Betamethasone. Received 12 injections in office for rheumatoid arthritis; currently not on Prednisone but takes it PRN.   - 12/14/2023 Urine culture: No growth  - 11/2/2023 Urine culture: No growth  - 7/28/2022 Urine culture: No growth  - 7/21/2022 Urine culture: Mixed urethral miguel angel  - 11/21/2020 Urine culture: No growth     Urinary Symptoms:   - The patient reports experiencing frequent UTI and yeast infections since her last vaginal delivery 7 years ago. Her  urine is never sent for culture and she is treated with antibiotics based on POCT urine dips in office for presumed UTI.   - She reports taking antibiotics around 12x within the last year/1-2x per month for the last year for a presumed UTI. They will sometimes start her on an antibiotic and then switch her to an alternate abx.   - Endorses the feeling of urinary hesitancy and strangury.   - She recently just completed course of abx 3 weeks ago and does not feel symptomatic of a UTI today.   - Reports that her UTI triggers include sexual intercourse.   - Her UTI sxs include pelvic/bladder pressure, strangury, urinary hesitancy, dysuria, and strangury.   - Denies experiencing gross hematuria.   - She denies experiencing bladder pain or pain with a full bladder that is resolved with urinating.   - No hx of pyelonephritis.   - Reports rare ALY with coughing, laughing, and sneezing but this is not bothersome; she declines being interested in ALY tx interventions. Does not wear pads for incontinence.     Vaginal Symptoms:   - She reports her recurrent yeast infection sxs include pelvic pressure, urinary hesitancy, and cottage cheese curd like discharge with vaginal itching.   - Rarely feeling a vaginal bulge after intercourse but denies feeling a bulge at baseline.     Bowel Symptoms:   - Denies experiencing difficulty controlling gas or stool; no ABL or FI.     OBGYN History:   - , x3   - Prior hx of third degree OASI.   - Currently sexually active and endorses intermittent positional dyspareunia but she is able to find positions that are comfortable/enjoyable.   - She is planning for her pap with LILIA Whitmore-CNP soon; no hx of abnormal paps.      Past Medical History:     - Rheumatoid arthritis (diagnosed 2 years ago) - was previously on methotrexate but was taking Celebrex 2 weeks ago but has recently discontinued with no pain flares  Past Medical History:   Diagnosis Date    Other specified  "postprocedural states     History of Papanicolaou smear       Past Surgical History:     Past Surgical History:   Procedure Laterality Date    COLONOSCOPY  08/18/2022    REPEAT 3 YEARS. A.  RANDOM COLON BIOPSIES: -- COLONIC MUCOSA, NO PATHOLOGICAL DIAGNOSIS.  B.  RECTAL POLYP: -- HYPERPLASTIC POLYP WITH EXTENSIVE CAUTERY ARTIFACT.    TONSILLECTOMY      WISDOM TOOTH EXTRACTION      X 4       Medications:     Prior to Admission medications    Medication Sig Start Date End Date Taking? Authorizing Provider   amitriptyline (Elavil) 25 mg tablet Take 1 tablet (25 mg) by mouth once daily at bedtime. 12/14/23   KRISTA Pérez   BD Tuberculin Syringe 1 mL 27 x 1/2\" syringe USE ONCE A WEEK 2/22/23   Historical Provider, MD   busPIRone (Buspar) 15 mg tablet Take 1 tablet (15 mg) by mouth 3 times a day as needed (ANXIETY). 12/14/23   KRISTA Pérez   calcium carbonate-vitamin D3 (Hi-Gunner Plus Vit D) 500 mg-5 mcg (200 unit) tablet Take 1 tablet by mouth 2 times a day. 12/14/23   KRISTA Pérez   celecoxib (CeleBREX) 200 mg capsule Take 1 capsule (200 mg) by mouth 2 times a day. 12/14/23   KRISTA Pérez   clotrimazole-betamethasone (Lotrisone) cream Apply 1 Application topically 2 times a day as needed (itching). 12/7/23   Mitra Cesar MD   diclofenac sodium (Voltaren) 1 % gel gel  7/28/22   Historical Provider, MD   DULoxetine (Cymbalta) 30 mg DR capsule  12/18/23   Historical Provider, MD   fluconazole (Diflucan) 150 mg tablet TAKE 1 TAB Q72 HOURS PRN 12/14/23   KRISTA Pérez   ibuprofen 800 mg tablet Take 1 tablet (800 mg) by mouth 3 times a day as needed. 4/5/23   Historical Provider, MD   leucovorin (Wellcovorin) 25 mg tablet Take 1 tablet (25 mg total) by mouth. 3/25/23   Historical Provider, MD   methenamine hippurate (Hiprex) 1 gram tablet Take 1 tablet (1 g) by mouth 2 times a day. 1/18/24 1/17/25  Corry Cherry MD MPH   ondansetron (Zofran) 4 mg tablet Take 1 tablet (4 " mg) by mouth 3 times a day as needed for nausea or vomiting. 10/19/23   Mitra Cesar MD   predniSONE (Deltasone) 10 mg tablet Take 1 tablet (10 mg) by mouth. 8/11/22   Historical Provider, MD   sulfaSALAzine (Azulfidine) 500 mg DR tablet 1 tablet (500 mg). 5/2/23   Historical Provider, MD   SUMAtriptan (Imitrex) 50 mg tablet Take 1 tablet (50 mg) by mouth twice a day. 7/21/22   Historical Provider, MD WILSON  Review of Systems   Constitutional: Negative.    HENT: Negative.     Eyes: Negative.    Respiratory: Negative.     Cardiovascular: Negative.    Gastrointestinal: Negative.    Endocrine: Negative.    Genitourinary: Negative.    Musculoskeletal: Negative.    Neurological: Negative.    Psychiatric/Behavioral: Negative.            PHYSICAL EXAM:    /79   Pulse 77   Wt 62.1 kg (136 lb 12.8 oz)   LMP  (LMP Unknown) Comment: periods abnormal  BMI 25.85 kg/m²   No LMP recorded (lmp unknown).      Declines chaperone for physical exam.      Well developed, well nourished, in no apparent distress.   Neurologic/Psychiatric:  Awake, Alert and Oriented times 3.  Affect normal.     GENITAL/URINARY:     External Genitalia:  The patient has normal appearing external genitalia, normal skenes and bartholins glands, and a normal hair distribution.  Her vulva is without lesions, erythema or discharge.  It is non-tender with appropriate sensation. Normal sensation on the bilateral labia and buttocks. Absent bulbocavernosus and anal wink reflexes.     Urethral Meatus:  Size normal, Location normal, Lesions absent, Prolapse absent.     Urethra:  Fullness absent, Masses absent.     Bladder:  Fullness absent, Masses absent, Tenderness absent.     Vagina:  General appearance normal, Estrogen effect normal, Discharge absent, Lesions absent.     Cervix: Normal, no discharge.   Uterus:  normal size, mobile, and nontender, possible posterior fibroid  Adnexa:   no masses bilaterally , some tenderness over the L adnexa but no  pain over the R adnexa    Anus/Perineum:  Lesions absent and Masses absent. Normal perineum.     Stress urinary incontinence not demonstrable.       Physical Exam  Genitourinary:      Bladder and urethral meatus normal.        Right Adnexa: not tender and no mass present.     Left Adnexa: tender.     Left Adnexa: no mass present.     Uterus is retroverted.      No urethral tenderness, hypermobility or stress urinary incontinence with cough stress test present.      Pelvic Floor: Levator muscle strength is 3/5.  POP-Q measurements were:      Aa: -2, Ba: -2, C: -8     gH: 3, pB: 2.5, TVL:     Ap: -2, Bp: -2, D: -9     Pelvic exam was performed with patient in the lithotomy position.         The patient has 3 out of 5 pelvic floor muscle strength.    She does not have myofascial tenderness on exam.   Her highest pain score on exam is 1        Visually concentric anal squeeze.   Rectal: Deferred.       Data and DIAGNOSTIC STUDIES REVIEWED   No results found.   Lab Results   Component Value Date    URINECULTURE No significant growth 12/14/2023      Lab Results   Component Value Date    GLUCOSE 88 12/20/2023    CALCIUM 9.4 12/20/2023     12/20/2023    K 3.9 12/20/2023    CO2 28 12/20/2023     12/20/2023    BUN 13 12/20/2023    CREATININE 0.74 12/20/2023     Lab Results   Component Value Date    WBC 8.6 12/18/2023    HGB 12.2 12/18/2023    HCT 38.9 12/18/2023    MCV 93 12/18/2023     12/18/2023

## 2024-01-18 NOTE — PATIENT INSTRUCTIONS
Follow up by virtual visit in 3 months.   Start Hiprex to prevent UTI  If you get uti symptoms leave a urine sample at a  lab  If you get yeast symptoms, call our office.

## 2024-01-19 LAB
APPEARANCE UR: ABNORMAL
BACTERIA UR CULT: NO GROWTH
BILIRUB UR STRIP.AUTO-MCNC: NEGATIVE MG/DL
COLOR UR: YELLOW
GLUCOSE UR STRIP.AUTO-MCNC: NEGATIVE MG/DL
HOLD SPECIMEN: NORMAL
KETONES UR STRIP.AUTO-MCNC: ABNORMAL MG/DL
LEUKOCYTE ESTERASE UR QL STRIP.AUTO: ABNORMAL
MUCOUS THREADS #/AREA URNS AUTO: ABNORMAL /LPF
NITRITE UR QL STRIP.AUTO: NEGATIVE
PH UR STRIP.AUTO: 5 [PH]
PROT UR STRIP.AUTO-MCNC: NEGATIVE MG/DL
RBC # UR STRIP.AUTO: ABNORMAL /UL
RBC #/AREA URNS AUTO: ABNORMAL /HPF
SP GR UR STRIP.AUTO: 1.02
SQUAMOUS #/AREA URNS AUTO: ABNORMAL /HPF
UROBILINOGEN UR STRIP.AUTO-MCNC: <2 MG/DL
WBC #/AREA URNS AUTO: ABNORMAL /HPF

## 2024-02-08 ENCOUNTER — LAB (OUTPATIENT)
Dept: LAB | Facility: LAB | Age: 29
End: 2024-02-08
Payer: COMMERCIAL

## 2024-02-08 ENCOUNTER — APPOINTMENT (OUTPATIENT)
Dept: PRIMARY CARE | Facility: CLINIC | Age: 29
End: 2024-02-08
Payer: COMMERCIAL

## 2024-02-08 DIAGNOSIS — N39.0 RECURRENT UTI: ICD-10-CM

## 2024-02-08 LAB
APPEARANCE UR: ABNORMAL
BILIRUB UR STRIP.AUTO-MCNC: NEGATIVE MG/DL
COLOR UR: YELLOW
GLUCOSE UR STRIP.AUTO-MCNC: NEGATIVE MG/DL
HOLD SPECIMEN: NORMAL
KETONES UR STRIP.AUTO-MCNC: NEGATIVE MG/DL
LEUKOCYTE ESTERASE UR QL STRIP.AUTO: ABNORMAL
MUCOUS THREADS #/AREA URNS AUTO: ABNORMAL /LPF
NITRITE UR QL STRIP.AUTO: NEGATIVE
PH UR STRIP.AUTO: 6 [PH]
PROT UR STRIP.AUTO-MCNC: NEGATIVE MG/DL
RBC # UR STRIP.AUTO: NEGATIVE /UL
RBC #/AREA URNS AUTO: ABNORMAL /HPF
SP GR UR STRIP.AUTO: 1.02
SQUAMOUS #/AREA URNS AUTO: ABNORMAL /HPF
UROBILINOGEN UR STRIP.AUTO-MCNC: <2 MG/DL
WBC #/AREA URNS AUTO: ABNORMAL /HPF

## 2024-02-08 PROCEDURE — 81001 URINALYSIS AUTO W/SCOPE: CPT

## 2024-02-08 PROCEDURE — 87086 URINE CULTURE/COLONY COUNT: CPT

## 2024-02-10 ENCOUNTER — TELEPHONE (OUTPATIENT)
Dept: OBSTETRICS AND GYNECOLOGY | Facility: HOSPITAL | Age: 29
End: 2024-02-10
Payer: COMMERCIAL

## 2024-02-10 DIAGNOSIS — R30.0 DYSURIA: Primary | ICD-10-CM

## 2024-02-10 DIAGNOSIS — F41.9 ANXIETY: ICD-10-CM

## 2024-02-10 LAB — BACTERIA UR CULT: NORMAL

## 2024-02-10 RX ORDER — PHENAZOPYRIDINE HYDROCHLORIDE 200 MG/1
200 TABLET, FILM COATED ORAL 3 TIMES DAILY PRN
Qty: 30 TABLET | Refills: 0 | Status: SHIPPED | OUTPATIENT
Start: 2024-02-10 | End: 2024-02-20

## 2024-02-10 NOTE — TELEPHONE ENCOUNTER
Returned home page. Patient reporting dysuria and urgency symptoms; requesting results from Ucx that was sent on 2/8.     Ucx: no significant growth    Advised patient that Ucx is negative and that she doesn't have a UTI. Discussed continuing methenamine BID as prescribed by Dr. Cherry. Will give rx for pyridium for discomfort.    Salina Ontiveros MD  Urogynecology and Reconstructive Pelvic Surgery Fellow  2/10/2024 12:30 PM

## 2024-02-13 RX ORDER — BUSPIRONE HYDROCHLORIDE 15 MG/1
15 TABLET ORAL 3 TIMES DAILY PRN
Qty: 90 TABLET | Refills: 1 | Status: SHIPPED | OUTPATIENT
Start: 2024-02-13

## 2024-03-04 ENCOUNTER — LAB (OUTPATIENT)
Dept: LAB | Facility: LAB | Age: 29
End: 2024-03-04
Payer: COMMERCIAL

## 2024-03-04 DIAGNOSIS — Z79.899 OTHER LONG TERM (CURRENT) DRUG THERAPY: ICD-10-CM

## 2024-03-04 DIAGNOSIS — M06.4 INFLAMMATORY POLYARTHROPATHY (MULTI): ICD-10-CM

## 2024-03-04 LAB
ALBUMIN SERPL BCP-MCNC: 4.3 G/DL (ref 3.4–5)
ALP SERPL-CCNC: 55 U/L (ref 33–110)
ALT SERPL W P-5'-P-CCNC: 12 U/L (ref 7–45)
ANION GAP SERPL CALC-SCNC: 10 MMOL/L (ref 10–20)
AST SERPL W P-5'-P-CCNC: 18 U/L (ref 9–39)
BILIRUB SERPL-MCNC: 0.6 MG/DL (ref 0–1.2)
BUN SERPL-MCNC: 11 MG/DL (ref 6–23)
CALCIUM SERPL-MCNC: 9.1 MG/DL (ref 8.6–10.3)
CHLORIDE SERPL-SCNC: 104 MMOL/L (ref 98–107)
CO2 SERPL-SCNC: 29 MMOL/L (ref 21–32)
CREAT SERPL-MCNC: 0.72 MG/DL (ref 0.5–1.05)
EGFRCR SERPLBLD CKD-EPI 2021: >90 ML/MIN/1.73M*2
GLUCOSE SERPL-MCNC: 82 MG/DL (ref 74–99)
POTASSIUM SERPL-SCNC: 3.6 MMOL/L (ref 3.5–5.3)
PROT SERPL-MCNC: 6.9 G/DL (ref 6.4–8.2)
SODIUM SERPL-SCNC: 139 MMOL/L (ref 136–145)

## 2024-03-04 PROCEDURE — 36415 COLL VENOUS BLD VENIPUNCTURE: CPT

## 2024-03-04 PROCEDURE — 80053 COMPREHEN METABOLIC PANEL: CPT

## 2024-03-07 ENCOUNTER — APPOINTMENT (OUTPATIENT)
Dept: PRIMARY CARE | Facility: CLINIC | Age: 29
End: 2024-03-07
Payer: COMMERCIAL

## 2024-03-21 ENCOUNTER — OFFICE VISIT (OUTPATIENT)
Dept: PRIMARY CARE | Facility: CLINIC | Age: 29
End: 2024-03-21
Payer: COMMERCIAL

## 2024-03-21 VITALS
BODY MASS INDEX: 25.49 KG/M2 | OXYGEN SATURATION: 98 % | WEIGHT: 135 LBS | HEIGHT: 61 IN | SYSTOLIC BLOOD PRESSURE: 103 MMHG | HEART RATE: 80 BPM | DIASTOLIC BLOOD PRESSURE: 71 MMHG

## 2024-03-21 DIAGNOSIS — F41.9 ANXIETY: Primary | ICD-10-CM

## 2024-03-21 DIAGNOSIS — K04.7 TOOTH INFECTION: ICD-10-CM

## 2024-03-21 DIAGNOSIS — E53.8 VITAMIN B12 DEFICIENCY: ICD-10-CM

## 2024-03-21 PROCEDURE — 1036F TOBACCO NON-USER: CPT | Performed by: NURSE PRACTITIONER

## 2024-03-21 PROCEDURE — 99214 OFFICE O/P EST MOD 30 MIN: CPT | Performed by: NURSE PRACTITIONER

## 2024-03-21 RX ORDER — SULFADIAZINE 500 MG/1
500 TABLET ORAL 2 TIMES DAILY
COMMUNITY
End: 2024-03-21 | Stop reason: ALTCHOICE

## 2024-03-21 RX ORDER — SULFASALAZINE 500 MG/1
500 TABLET, DELAYED RELEASE ORAL 2 TIMES DAILY
COMMUNITY

## 2024-03-21 RX ORDER — DULOXETIN HYDROCHLORIDE 20 MG/1
20 CAPSULE, DELAYED RELEASE ORAL 2 TIMES DAILY
Qty: 60 CAPSULE | Refills: 1 | Status: SHIPPED | OUTPATIENT
Start: 2024-03-21 | End: 2024-05-10

## 2024-03-21 RX ORDER — CLINDAMYCIN HYDROCHLORIDE 300 MG/1
300 CAPSULE ORAL 4 TIMES DAILY
Qty: 40 CAPSULE | Refills: 0 | Status: SHIPPED | OUTPATIENT
Start: 2024-03-21 | End: 2024-04-05

## 2024-03-21 ASSESSMENT — ENCOUNTER SYMPTOMS
COUGH: 0
WEAKNESS: 0
WOUND: 0
FREQUENCY: 0
CHEST TIGHTNESS: 0
CHILLS: 0
PALPITATIONS: 0
CONSTIPATION: 0
EYE REDNESS: 0
NERVOUS/ANXIOUS: 0
DYSURIA: 0
HEMATURIA: 0
SORE THROAT: 0
HEADACHES: 0
PHOTOPHOBIA: 0
ARTHRALGIAS: 0
BLOOD IN STOOL: 0
APNEA: 0
MYALGIAS: 0
NAUSEA: 0
WHEEZING: 0
CHOKING: 0
EYE PAIN: 0
BACK PAIN: 0
VOMITING: 0
ABDOMINAL DISTENTION: 0
UNEXPECTED WEIGHT CHANGE: 0
FEVER: 0
SPEECH DIFFICULTY: 0
DIARRHEA: 0
FATIGUE: 0
NECK PAIN: 0
SEIZURES: 0
JOINT SWELLING: 0
TROUBLE SWALLOWING: 0
DIFFICULTY URINATING: 0
NUMBNESS: 0
SHORTNESS OF BREATH: 0
FLANK PAIN: 0
DIZZINESS: 0
ABDOMINAL PAIN: 0
CONFUSION: 0
SLEEP DISTURBANCE: 0
FACIAL ASYMMETRY: 0

## 2024-03-21 ASSESSMENT — PATIENT HEALTH QUESTIONNAIRE - PHQ9
SUM OF ALL RESPONSES TO PHQ9 QUESTIONS 1 AND 2: 0
1. LITTLE INTEREST OR PLEASURE IN DOING THINGS: NOT AT ALL
2. FEELING DOWN, DEPRESSED OR HOPELESS: NOT AT ALL

## 2024-03-21 NOTE — PROGRESS NOTES
"Subjective   Patient ID: Marisabel Cowan is a 29 y.o. female who presents for Medication Problem (Pt would like an alternative medication for Buspar. Pt states it makes her dizzy  ).      HPI:  Presents today for TO DISCUSS MED CHANGES FOR ANXIETY.  SHE STATES THE AMITRIPTYLINE MADE HER FEEL DIZZY AND INCREASED FATIGUE. C/O ANXIETY AND MILD DEPRESSION X SEVERAL MONTHS   modifying factors consists of SHE HAS RA THAT CAUSES HER TO FEEL DOWN.  associated symptoms consist of DENIES SUICIDAL IDEATION  prior treatment consists of medication BUSPAR AND AMITRIPTYLINE       C/O RIGHT UPPER TOOTH PAIN X SEVERAL WEEKS    modifying factors consists of  IS FOLLOWING WITH A DENTIST AND HAS FU APPT SCHEDULED   associated symptoms consist of   GUM REDNESS    prior treatment consists of medication PREVIOUS ATB BY DENTIST BUT DOES NOT REMEMBER NAME     RA-FOLLOWS WITH RHEUM DR. GARCIA  B12- REQUESTING B12 INJECTIONS BE SENT TO PHARMACY TO DO AT HOME     Visit Vitals  /71 (BP Location: Right arm, Patient Position: Sitting)   Pulse 80   Ht 1.549 m (5' 1\")   Wt 61.2 kg (135 lb)   SpO2 98%   BMI 25.51 kg/m²   OB Status Having periods   Smoking Status Never   BSA 1.62 m²        Review of Systems   Constitutional:  Negative for chills, fatigue, fever and unexpected weight change.   HENT:  Negative for congestion, ear pain, sore throat and trouble swallowing.    Eyes:  Negative for photophobia, pain, redness and visual disturbance.   Respiratory:  Negative for apnea, cough, choking, chest tightness, shortness of breath and wheezing.    Cardiovascular:  Negative for chest pain, palpitations and leg swelling.   Gastrointestinal:  Negative for abdominal distention, abdominal pain, blood in stool, constipation, diarrhea, nausea and vomiting.   Genitourinary:  Negative for difficulty urinating, dysuria, flank pain, frequency, hematuria and urgency.   Musculoskeletal:  Negative for arthralgias, back pain, gait problem, joint swelling, " myalgias and neck pain.   Skin:  Negative for rash and wound.   Neurological:  Negative for dizziness, seizures, syncope, facial asymmetry, speech difficulty, weakness, numbness and headaches.   Psychiatric/Behavioral:  Negative for confusion, sleep disturbance and suicidal ideas. The patient is not nervous/anxious.        Objective     Physical Exam  Constitutional:       Appearance: Normal appearance. She is normal weight.   HENT:      Head: Normocephalic.      Mouth/Throat:      Mouth: Mucous membranes are moist.      Comments: RIGHT UPPER MOLE CAVITY NOTED. SURROUNDING GUMS RED AND INFLAMED   Eyes:      Extraocular Movements: Extraocular movements intact.      Conjunctiva/sclera: Conjunctivae normal.      Pupils: Pupils are equal, round, and reactive to light.   Cardiovascular:      Rate and Rhythm: Normal rate and regular rhythm.      Pulses: Normal pulses.      Heart sounds: Normal heart sounds.   Pulmonary:      Effort: Pulmonary effort is normal.      Breath sounds: Normal breath sounds.   Musculoskeletal:         General: Normal range of motion.      Cervical back: Normal range of motion.   Skin:     General: Skin is warm and dry.   Neurological:      General: No focal deficit present.      Mental Status: She is alert and oriented to person, place, and time.   Psychiatric:         Mood and Affect: Mood normal.         Behavior: Behavior normal.         Thought Content: Thought content normal.         Judgment: Judgment normal.            Assessment/Plan   Problem List Items Addressed This Visit       Anxiety - Primary    Relevant Medications    DULoxetine (Cymbalta) 20 mg DR capsule    Vitamin B12 deficiency    Relevant Medications    cyanocobalamin, vitamin B-12, 1,000 mcg/mL kit    Tooth infection    Relevant Medications    clindamycin (Cleocin) 300 mg capsule     I WILL START HER ON CYMBALTA 20 MG BID FOR ANXIETY, DEPRESSION, AND PAIN. INSTRUCTED TO START DAILY X 2 WEEKS, THEN TAKE BID. REFUSING FU APPT  AT THIS TIME FOR MED CHECK    WE DISCUSSED MOST COMMON SIDE EFFECTS OF PRESCRIBED MEDICATIONS. INDICATIONS, RISK, COMPLICATIONS, AND ALTERNATIVES OF MEDICATION/THERAPEUTICS WERE EXPLAINED AND DISCUSSED. PLEASE MONITOR CLOSELY FOR ANY UNTOWARD SIDE EFFECTS OR COMPLICATIONS OF MEDICATIONS. PATIENT IS STRONGLY ADVISED TO BE COMPLIANT WITH RECOMMENDATIONS. QUESTIONS AND CONCERNS WERE ADDRESSED. INSTRUCTED TO CALL, RETURN SOONER, OR GO TO THE ER,  IF SYMPTOMS PERSIST OR WORSEN. THEY VOICED UNDERSTANDING AND  DENIES FURTHER QUESTIONS AT THIS TIME.    TIME CODE  1. PREPARATION FOR PATIENT'S VISIT (REVIEWING CHART, CURRENT MEDICAL RECORDS, OUTSIDE HEALTH PROVIDER RECORDS, PREVIOUS HISTORY, EXAM, TEST, PROCEDURE, AND MEDICATIONS)  2. FACE TO FACE ENCOUNTER OBTAINING HISTORY FROM THE PATIENT/FAMILY/CAREGIVERS; PERFORMING EVALUATION AND EXAMINATION; ORDERING TESTS OR PROCEDURES; REFERRING AND COMMUNICATING WITH OTHER HEALTHCARE PROVIDERS; COUNSELING AND EDUCATION OF THE PATIENT/FAMILY/CAREGIVERS; INDEPENDENTLY INTERPRETING RESULTS (TESTS, LABS, PROCEDURES, IMAGING) AND COMMUNICATING AND EXPLAINING RESULTS TO THE PATIENT/FAMILY/CAREGIVERS  3. COORDINATION OF CARE; PREPARING AND PRINTING DISCHARGE INSTRUCTIONS AND ANY EDUCATIONAL MATERIAL FOR THE PATIENT/FAMILY/CAREGIVERS. DOCUMENTING CLINICAL INFORMATION IN THE ELECTRONIC MEDICAL RECORD   4. REVIEWING OARRS AS NEEDED    MDM  1) COMPLEXITY: MORE THAN 1 STABLE CHRONIC CONDITION ADDRESSED OR 1 ACUTE ILLNESS ADDRESSED   2)DATA: TESTS INTERPRETED AND OR ORDERED, TOOK INDEPENDENT HISTORY OR RECORDS REVIEWED  3)RISK: MODERATE RISK DUE TO NATURE OF MEDICAL CONDITIONS/COMORBIDITY OR MEDICATIONS ORDERED OR SURGICAL OR PROCEDURE REFERRAL    WILL CALL PER PATIENT REQUEST

## 2024-03-23 DIAGNOSIS — K04.7 TOOTH INFECTION: ICD-10-CM

## 2024-04-05 RX ORDER — CLINDAMYCIN HYDROCHLORIDE 300 MG/1
300 CAPSULE ORAL 4 TIMES DAILY
Qty: 40 CAPSULE | Refills: 0 | Status: SHIPPED | OUTPATIENT
Start: 2024-04-05 | End: 2024-04-15

## 2024-04-08 ENCOUNTER — APPOINTMENT (OUTPATIENT)
Dept: RADIOLOGY | Facility: HOSPITAL | Age: 29
End: 2024-04-08
Payer: OTHER GOVERNMENT

## 2024-04-08 ENCOUNTER — HOSPITAL ENCOUNTER (EMERGENCY)
Facility: HOSPITAL | Age: 29
Discharge: HOME | End: 2024-04-08
Attending: EMERGENCY MEDICINE
Payer: OTHER GOVERNMENT

## 2024-04-08 VITALS
BODY MASS INDEX: 25.68 KG/M2 | SYSTOLIC BLOOD PRESSURE: 112 MMHG | OXYGEN SATURATION: 99 % | DIASTOLIC BLOOD PRESSURE: 87 MMHG | TEMPERATURE: 98.8 F | HEIGHT: 61 IN | HEART RATE: 95 BPM | RESPIRATION RATE: 16 BRPM | WEIGHT: 136 LBS

## 2024-04-08 DIAGNOSIS — S43.401A SPRAIN OF RIGHT SHOULDER, UNSPECIFIED SHOULDER SPRAIN TYPE, INITIAL ENCOUNTER: Primary | ICD-10-CM

## 2024-04-08 PROCEDURE — 73030 X-RAY EXAM OF SHOULDER: CPT | Mod: RT

## 2024-04-08 PROCEDURE — 99283 EMERGENCY DEPT VISIT LOW MDM: CPT

## 2024-04-08 PROCEDURE — 73030 X-RAY EXAM OF SHOULDER: CPT | Mod: RIGHT SIDE | Performed by: RADIOLOGY

## 2024-04-08 ASSESSMENT — ENCOUNTER SYMPTOMS
SORE THROAT: 0
ABDOMINAL PAIN: 0
PALPITATIONS: 0
SHORTNESS OF BREATH: 0
ARTHRALGIAS: 0
COLOR CHANGE: 0
DYSURIA: 0
EYE PAIN: 0
HEMATURIA: 0
FEVER: 0
CHILLS: 0
VOMITING: 0
SEIZURES: 0
COUGH: 0
BACK PAIN: 0

## 2024-04-08 ASSESSMENT — PAIN - FUNCTIONAL ASSESSMENT: PAIN_FUNCTIONAL_ASSESSMENT: 0-10

## 2024-04-08 ASSESSMENT — PAIN DESCRIPTION - LOCATION: LOCATION: SHOULDER

## 2024-04-08 ASSESSMENT — PAIN DESCRIPTION - PROGRESSION: CLINICAL_PROGRESSION: GRADUALLY WORSENING

## 2024-04-08 ASSESSMENT — COLUMBIA-SUICIDE SEVERITY RATING SCALE - C-SSRS
1. IN THE PAST MONTH, HAVE YOU WISHED YOU WERE DEAD OR WISHED YOU COULD GO TO SLEEP AND NOT WAKE UP?: NO
6. HAVE YOU EVER DONE ANYTHING, STARTED TO DO ANYTHING, OR PREPARED TO DO ANYTHING TO END YOUR LIFE?: NO
2. HAVE YOU ACTUALLY HAD ANY THOUGHTS OF KILLING YOURSELF?: NO

## 2024-04-08 ASSESSMENT — PAIN DESCRIPTION - PAIN TYPE: TYPE: ACUTE PAIN

## 2024-04-08 ASSESSMENT — PAIN DESCRIPTION - FREQUENCY: FREQUENCY: INTERMITTENT

## 2024-04-08 ASSESSMENT — PAIN DESCRIPTION - ORIENTATION: ORIENTATION: RIGHT

## 2024-04-08 ASSESSMENT — PAIN DESCRIPTION - ONSET: ONSET: SUDDEN

## 2024-04-08 ASSESSMENT — PAIN SCALES - GENERAL: PAINLEVEL_OUTOF10: 7

## 2024-04-08 ASSESSMENT — PAIN DESCRIPTION - DESCRIPTORS: DESCRIPTORS: SORE;STABBING;SHARP

## 2024-04-08 NOTE — ED PROVIDER NOTES
Patient is a 29-year-old female who presents to the emergency room with a chief complaint of right shoulder pain.  She states that this is a work-related injury.  She states that she was lifting a package on the package weighed 64 pounds.  As she was lifting the package she felt pain immediately to her right shoulder.  She states that this occurred at around 730 this morning.  She states that she continued to work thinking that the pain would get better but it continued to worsen.  She states the pain is worse with movement especially when she abducts.  She denies any direct trauma to her right shoulder.  She does have a history of rheumatoid arthritis but states that this feels nothing like her rheumatoid arthritis flares           Review of Systems   Constitutional:  Negative for chills and fever.   HENT:  Negative for ear pain and sore throat.    Eyes:  Negative for pain and visual disturbance.   Respiratory:  Negative for cough and shortness of breath.    Cardiovascular:  Negative for chest pain and palpitations.   Gastrointestinal:  Negative for abdominal pain and vomiting.   Genitourinary:  Negative for dysuria and hematuria.   Musculoskeletal:  Negative for arthralgias and back pain.        Right shoulder pain   Skin:  Negative for color change and rash.   Neurological:  Negative for seizures and syncope.   All other systems reviewed and are negative.       Physical Exam  Vitals and nursing note reviewed.   Constitutional:       General: She is not in acute distress.     Appearance: Normal appearance. She is well-developed.   HENT:      Head: Normocephalic and atraumatic.   Eyes:      Extraocular Movements: Extraocular movements intact.      Conjunctiva/sclera: Conjunctivae normal.      Pupils: Pupils are equal, round, and reactive to light.   Cardiovascular:      Rate and Rhythm: Normal rate and regular rhythm.      Heart sounds: No murmur heard.  Pulmonary:      Effort: Pulmonary effort is normal. No  respiratory distress.      Breath sounds: Normal breath sounds. No stridor. No wheezing or rhonchi.   Abdominal:      General: Abdomen is flat.      Palpations: Abdomen is soft.      Tenderness: There is no abdominal tenderness.   Musculoskeletal:         General: No swelling.      Right shoulder: Tenderness present. No swelling, deformity, effusion, laceration, bony tenderness or crepitus. Decreased range of motion. Decreased strength. Normal pulse.      Cervical back: Neck supple.   Skin:     General: Skin is warm and dry.      Capillary Refill: Capillary refill takes less than 2 seconds.   Neurological:      General: No focal deficit present.      Mental Status: She is alert.   Psychiatric:         Mood and Affect: Mood normal.          Labs Reviewed - No data to display     XR shoulder right 2+ views   Final Result   Negative             MACRO:   None        Signed by: Joseph Schoenberger 4/8/2024 12:57 PM   Dictation workstation:   [id number]           Procedures     Medical Decision Making  Patient is a 29-year-old female who presents to the emergency department with a chief complaint of right shoulder pain after lifting a package while at work.  She has somewhat limited range of motion secondary to pain but overall range of motion is intact.  Pain is mainly worse with abduction.  No direct trauma.  X-ray of the right shoulder shows no fracture or dislocation or any acute process per the radiologist.  I did independently interpret the x-ray of the right shoulder and also do not appreciate any fracture or dislocation.  Patient instructed to ice and rest.  She was written work restrictions with minimal work involving the right shoulder and instructed to follow-up with  occupational health.  She does verbalize understanding.  It is recommended that she follow-up with orthopedics if no improvement of her symptoms.    Amount and/or Complexity of Data Reviewed  Radiology: ordered and independent interpretation  performed. Decision-making details documented in ED Course.     Details: X-ray of the right shoulder shows no fracture or dislocation per my interpretation         Diagnoses as of 04/08/24 1308   Sprain of right shoulder, unspecified shoulder sprain type, initial encounter                    Lina Long PA-C  04/08/24 1309

## 2024-04-10 ENCOUNTER — APPOINTMENT (OUTPATIENT)
Dept: ORTHOPEDIC SURGERY | Facility: CLINIC | Age: 29
End: 2024-04-10
Payer: OTHER GOVERNMENT

## 2024-04-26 ENCOUNTER — HOSPITAL ENCOUNTER (OUTPATIENT)
Dept: RADIOLOGY | Facility: HOSPITAL | Age: 29
Discharge: HOME | End: 2024-04-26
Payer: OTHER GOVERNMENT

## 2024-04-26 DIAGNOSIS — S43.401A UNSPECIFIED SPRAIN OF RIGHT SHOULDER JOINT, INITIAL ENCOUNTER: ICD-10-CM

## 2024-04-26 PROCEDURE — 73221 MRI JOINT UPR EXTREM W/O DYE: CPT | Mod: RIGHT SIDE | Performed by: RADIOLOGY

## 2024-04-26 PROCEDURE — 73221 MRI JOINT UPR EXTREM W/O DYE: CPT | Mod: RT

## 2024-05-07 ENCOUNTER — TELEPHONE (OUTPATIENT)
Dept: PRIMARY CARE | Facility: CLINIC | Age: 29
End: 2024-05-07
Payer: OTHER GOVERNMENT

## 2024-05-07 NOTE — TELEPHONE ENCOUNTER
----- Message from Lizzie Andre MA sent at 5/7/2024  1:30 PM EDT -----    ----- Message -----  From: KRISTA Pérez  Sent: 5/7/2024   1:18 PM EDT  To: Lizzie Andre MA    I SEE SHE WAS IN THE ER FOR SHOULDER PAIN. MRI DONE. IS SHE SEEING ORTHO? I WOULD LIKE TO DISCUSS HER MRI WITH HER. SCHEDULE OC  ----- Message -----  From: Bianca Manzanares  Sent: 5/7/2024  12:18 PM EDT  To: KRISTA Pérez    Cable

## 2024-05-07 NOTE — TELEPHONE ENCOUNTER
CALLED PATIENT SHE STATED THAT THIS ER VISIT WAS WORKER'S COMP.  SHE STATED SHE IS FOLLOWING WITH A WORKERS COMP DOCTOR.

## 2024-05-10 DIAGNOSIS — F41.9 ANXIETY: ICD-10-CM

## 2024-05-10 RX ORDER — DULOXETIN HYDROCHLORIDE 20 MG/1
CAPSULE, DELAYED RELEASE ORAL
Qty: 60 CAPSULE | Refills: 1 | Status: SHIPPED | OUTPATIENT
Start: 2024-05-10

## 2024-06-04 ENCOUNTER — LAB (OUTPATIENT)
Dept: LAB | Facility: LAB | Age: 29
End: 2024-06-04
Payer: COMMERCIAL

## 2024-06-04 DIAGNOSIS — Z79.899 OTHER LONG TERM (CURRENT) DRUG THERAPY: ICD-10-CM

## 2024-06-04 DIAGNOSIS — M06.4 INFLAMMATORY POLYARTHROPATHY (MULTI): Primary | ICD-10-CM

## 2024-06-04 LAB
ALBUMIN SERPL BCP-MCNC: 4.4 G/DL (ref 3.4–5)
ALP SERPL-CCNC: 53 U/L (ref 33–110)
ALT SERPL W P-5'-P-CCNC: 21 U/L (ref 7–45)
ANION GAP SERPL CALC-SCNC: 11 MMOL/L (ref 10–20)
AST SERPL W P-5'-P-CCNC: 20 U/L (ref 9–39)
BASOPHILS # BLD AUTO: 0.03 X10*3/UL (ref 0–0.1)
BASOPHILS NFR BLD AUTO: 0.4 %
BILIRUB SERPL-MCNC: 0.6 MG/DL (ref 0–1.2)
BUN SERPL-MCNC: 15 MG/DL (ref 6–23)
CALCIUM SERPL-MCNC: 9.3 MG/DL (ref 8.6–10.3)
CHLORIDE SERPL-SCNC: 106 MMOL/L (ref 98–107)
CO2 SERPL-SCNC: 27 MMOL/L (ref 21–32)
CREAT SERPL-MCNC: 1.29 MG/DL (ref 0.5–1.05)
EGFRCR SERPLBLD CKD-EPI 2021: 57 ML/MIN/1.73M*2
EOSINOPHIL # BLD AUTO: 0.43 X10*3/UL (ref 0–0.7)
EOSINOPHIL NFR BLD AUTO: 6.4 %
ERYTHROCYTE [DISTWIDTH] IN BLOOD BY AUTOMATED COUNT: 13 % (ref 11.5–14.5)
GLUCOSE SERPL-MCNC: 78 MG/DL (ref 74–99)
HCT VFR BLD AUTO: 35.9 % (ref 36–46)
HGB BLD-MCNC: 11.2 G/DL (ref 12–16)
IMM GRANULOCYTES # BLD AUTO: 0.02 X10*3/UL (ref 0–0.7)
IMM GRANULOCYTES NFR BLD AUTO: 0.3 % (ref 0–0.9)
LYMPHOCYTES # BLD AUTO: 1.94 X10*3/UL (ref 1.2–4.8)
LYMPHOCYTES NFR BLD AUTO: 28.7 %
MCH RBC QN AUTO: 29 PG (ref 26–34)
MCHC RBC AUTO-ENTMCNC: 31.2 G/DL (ref 32–36)
MCV RBC AUTO: 93 FL (ref 80–100)
MONOCYTES # BLD AUTO: 0.59 X10*3/UL (ref 0.1–1)
MONOCYTES NFR BLD AUTO: 8.7 %
NEUTROPHILS # BLD AUTO: 3.74 X10*3/UL (ref 1.2–7.7)
NEUTROPHILS NFR BLD AUTO: 55.5 %
NRBC BLD-RTO: 0 /100 WBCS (ref 0–0)
PLATELET # BLD AUTO: 241 X10*3/UL (ref 150–450)
POTASSIUM SERPL-SCNC: 3.7 MMOL/L (ref 3.5–5.3)
PROT SERPL-MCNC: 6.8 G/DL (ref 6.4–8.2)
RBC # BLD AUTO: 3.86 X10*6/UL (ref 4–5.2)
SODIUM SERPL-SCNC: 140 MMOL/L (ref 136–145)
WBC # BLD AUTO: 6.8 X10*3/UL (ref 4.4–11.3)

## 2024-06-04 PROCEDURE — 85025 COMPLETE CBC W/AUTO DIFF WBC: CPT

## 2024-06-04 PROCEDURE — 80053 COMPREHEN METABOLIC PANEL: CPT

## 2024-06-11 RX ORDER — CYANOCOBALAMIN 1000 UG/ML
1000 INJECTION, SOLUTION INTRAMUSCULAR; SUBCUTANEOUS
COMMUNITY
Start: 2024-04-24

## 2024-06-12 ENCOUNTER — APPOINTMENT (OUTPATIENT)
Dept: PRIMARY CARE | Facility: CLINIC | Age: 29
End: 2024-06-12
Payer: COMMERCIAL

## 2024-06-12 VITALS
OXYGEN SATURATION: 99 % | WEIGHT: 144 LBS | HEIGHT: 61 IN | DIASTOLIC BLOOD PRESSURE: 70 MMHG | BODY MASS INDEX: 27.19 KG/M2 | HEART RATE: 88 BPM | SYSTOLIC BLOOD PRESSURE: 104 MMHG

## 2024-06-12 DIAGNOSIS — E61.1 LOW IRON: Primary | ICD-10-CM

## 2024-06-12 DIAGNOSIS — N28.9 FUNCTION KIDNEY DECREASED: ICD-10-CM

## 2024-06-12 DIAGNOSIS — J45.20 MILD INTERMITTENT CHILDHOOD ASTHMA WITHOUT COMPLICATION (HHS-HCC): ICD-10-CM

## 2024-06-12 PROCEDURE — 99214 OFFICE O/P EST MOD 30 MIN: CPT | Performed by: NURSE PRACTITIONER

## 2024-06-12 PROCEDURE — 1036F TOBACCO NON-USER: CPT | Performed by: NURSE PRACTITIONER

## 2024-06-12 RX ORDER — TRAMADOL HYDROCHLORIDE 50 MG/1
TABLET ORAL
COMMUNITY
Start: 2024-06-06

## 2024-06-12 ASSESSMENT — ENCOUNTER SYMPTOMS
SEIZURES: 0
SHORTNESS OF BREATH: 0
DIARRHEA: 0
FATIGUE: 0
FEVER: 0
APNEA: 0
UNEXPECTED WEIGHT CHANGE: 0
FLANK PAIN: 0
NUMBNESS: 0
NAUSEA: 0
CONFUSION: 0
DYSURIA: 0
CONSTIPATION: 0
SORE THROAT: 0
EYE PAIN: 0
CHOKING: 0
PALPITATIONS: 0
TROUBLE SWALLOWING: 0
WEAKNESS: 0
FREQUENCY: 0
MYALGIAS: 0
FACIAL ASYMMETRY: 0
HEMATURIA: 0
ARTHRALGIAS: 1
NECK PAIN: 0
BACK PAIN: 0
NERVOUS/ANXIOUS: 0
JOINT SWELLING: 0
SPEECH DIFFICULTY: 0
COUGH: 0
ABDOMINAL PAIN: 0
WOUND: 0
BLOOD IN STOOL: 0
CHEST TIGHTNESS: 0
PHOTOPHOBIA: 0
ABDOMINAL DISTENTION: 0
DIZZINESS: 0
SLEEP DISTURBANCE: 0
CHILLS: 0
DIFFICULTY URINATING: 0
HEADACHES: 0
WHEEZING: 0
VOMITING: 0
EYE REDNESS: 0

## 2024-06-12 NOTE — PROGRESS NOTES
"Subjective   Patient ID: Marisabel Cowan is a 29 y.o. female who presents for Follow-up (After labs from RA doctor  ).      HPI:  Presents today for TO DISCUSS LABS DONE BY [facility], DR. GARCIA. SHE WAS TOLD TO COME HERE TO DISCUSS. NO FURTHER COMPLAINTS      DECREASE KIDNEY FUNCTION NOTED ON 6/4/24. SHE HAS NOT BEEN TAKING ANY NSAIDS. SHE IS FOLLOWING WITH [facility] AND HAS BEEN ON SULFASALAZINE, ALTHOUGH SHE HAS NOT BEEN TAKING FOR THE PAST 3 WEEKS.       RA- APPT WITH [facility] IN JULY     Visit Vitals  /70 (BP Location: Right arm, Patient Position: Sitting)   Pulse 88   Ht 1.549 m (5' 1\")   Wt 65.3 kg (144 lb)   SpO2 99%   BMI 27.21 kg/m²   OB Status Having periods   Smoking Status Never   BSA 1.68 m²        Review of Systems   Constitutional:  Negative for chills, fatigue, fever and unexpected weight change.   HENT:  Negative for congestion, ear pain, sore throat and trouble swallowing.    Eyes:  Negative for photophobia, pain, redness and visual disturbance.   Respiratory:  Negative for apnea, cough, choking, chest tightness, shortness of breath and wheezing.    Cardiovascular:  Negative for chest pain, palpitations and leg swelling.   Gastrointestinal:  Negative for abdominal distention, abdominal pain, blood in stool, constipation, diarrhea, nausea and vomiting.   Genitourinary:  Negative for difficulty urinating, dysuria, flank pain, frequency, hematuria and urgency.   Musculoskeletal:  Positive for arthralgias. Negative for back pain, gait problem, joint swelling, myalgias and neck pain.   Skin:  Negative for rash and wound.   Neurological:  Negative for dizziness, seizures, syncope, facial asymmetry, speech difficulty, weakness, numbness and headaches.   Psychiatric/Behavioral:  Negative for confusion, sleep disturbance and suicidal ideas. The patient is not nervous/anxious.        Objective     Physical Exam  Constitutional:       Appearance: Normal appearance. She is normal weight.   HENT:      Head: " Normocephalic.   Eyes:      Extraocular Movements: Extraocular movements intact.      Conjunctiva/sclera: Conjunctivae normal.      Pupils: Pupils are equal, round, and reactive to light.   Cardiovascular:      Rate and Rhythm: Normal rate and regular rhythm.      Pulses: Normal pulses.      Heart sounds: Normal heart sounds.   Pulmonary:      Effort: Pulmonary effort is normal.      Breath sounds: Normal breath sounds.   Musculoskeletal:         General: Normal range of motion.      Cervical back: Normal range of motion.   Skin:     General: Skin is warm and dry.   Neurological:      General: No focal deficit present.      Mental Status: She is alert and oriented to person, place, and time.   Psychiatric:         Mood and Affect: Mood normal.         Behavior: Behavior normal.         Thought Content: Thought content normal.         Judgment: Judgment normal.            Assessment/Plan   Problem List Items Addressed This Visit       Low iron - Primary    Relevant Orders    Iron and TIBC    Ferritin    Function kidney decreased    Relevant Orders    Referral to Nephrology   INSTRUCTED TO DO LABS PREVIOUSLY ORDERED AND LABS ORDERED TODAY.      PLEASE MONITOR CLOSELY FOR ANY UNTOWARD SIDE EFFECTS OR COMPLICATIONS OF MEDICATIONS. PATIENT IS STRONGLY ADVISED TO BE COMPLIANT WITH RECOMMENDATIONS. QUESTIONS AND CONCERNS WERE ADDRESSED. INSTRUCTED TO CALL, RETURN SOONER, OR GO TO THE ER,  IF SYMPTOMS PERSIST OR WORSEN. THEY VOICED UNDERSTANDING AND  DENIES FURTHER QUESTIONS AT THIS TIME.    TIME CODE  1. PREPARATION FOR PATIENT'S VISIT (REVIEWING CHART, CURRENT MEDICAL RECORDS, OUTSIDE HEALTH PROVIDER RECORDS, PREVIOUS HISTORY, EXAM, TEST, PROCEDURE, AND MEDICATIONS)  2. FACE TO FACE ENCOUNTER OBTAINING HISTORY FROM THE PATIENT/FAMILY/CAREGIVERS; PERFORMING EVALUATION AND EXAMINATION; ORDERING TESTS OR PROCEDURES; REFERRING AND COMMUNICATING WITH OTHER HEALTHCARE PROVIDERS; COUNSELING AND EDUCATION OF THE  PATIENT/FAMILY/CAREGIVERS; INDEPENDENTLY INTERPRETING RESULTS (TESTS, LABS, PROCEDURES, IMAGING) AND COMMUNICATING AND EXPLAINING RESULTS TO THE PATIENT/FAMILY/CAREGIVERS  3. COORDINATION OF CARE; PREPARING AND PRINTING DISCHARGE INSTRUCTIONS AND ANY EDUCATIONAL MATERIAL FOR THE PATIENT/FAMILY/CAREGIVERS. DOCUMENTING CLINICAL INFORMATION IN THE ELECTRONIC MEDICAL RECORD   4. REVIEWING OARRS AS NEEDED    MDM  1) COMPLEXITY: MORE THAN 1 STABLE CHRONIC CONDITION ADDRESSED OR 1 ACUTE ILLNESS ADDRESSED   2)DATA: TESTS INTERPRETED AND OR ORDERED, TOOK INDEPENDENT HISTORY OR RECORDS REVIEWED  3)RISK: MODERATE RISK DUE TO NATURE OF MEDICAL CONDITIONS/COMORBIDITY OR MEDICATIONS ORDERED OR SURGICAL OR PROCEDURE REFERRAL    FU AFTER REFERRAL

## 2024-06-13 ENCOUNTER — HOSPITAL ENCOUNTER (OUTPATIENT)
Dept: RADIOLOGY | Facility: HOSPITAL | Age: 29
Setting detail: OBSERVATION
Discharge: HOME | End: 2024-06-13
Payer: COMMERCIAL

## 2024-06-13 ENCOUNTER — LAB (OUTPATIENT)
Dept: LAB | Facility: LAB | Age: 29
End: 2024-06-13
Payer: COMMERCIAL

## 2024-06-13 DIAGNOSIS — N28.9 FUNCTION KIDNEY DECREASED: ICD-10-CM

## 2024-06-13 DIAGNOSIS — M06.9 RHEUMATOID ARTHRITIS OF OTHER SITE, UNSPECIFIED WHETHER RHEUMATOID FACTOR PRESENT (MULTI): ICD-10-CM

## 2024-06-13 DIAGNOSIS — E53.8 VITAMIN B12 DEFICIENCY: ICD-10-CM

## 2024-06-13 DIAGNOSIS — F41.9 ANXIETY: ICD-10-CM

## 2024-06-13 DIAGNOSIS — E61.1 LOW IRON: ICD-10-CM

## 2024-06-13 LAB
25(OH)D3 SERPL-MCNC: 115 NG/ML (ref 30–100)
ALBUMIN SERPL BCP-MCNC: 4.5 G/DL (ref 3.4–5)
ALP SERPL-CCNC: 60 U/L (ref 33–110)
ALT SERPL W P-5'-P-CCNC: 10 U/L (ref 7–45)
ANION GAP SERPL CALC-SCNC: 10 MMOL/L (ref 10–20)
AST SERPL W P-5'-P-CCNC: 13 U/L (ref 9–39)
BASOPHILS # BLD AUTO: 0.03 X10*3/UL (ref 0–0.1)
BASOPHILS NFR BLD AUTO: 0.5 %
BILIRUB SERPL-MCNC: 0.5 MG/DL (ref 0–1.2)
BUN SERPL-MCNC: 14 MG/DL (ref 6–23)
CALCIUM SERPL-MCNC: 9.1 MG/DL (ref 8.6–10.3)
CHLORIDE SERPL-SCNC: 107 MMOL/L (ref 98–107)
CHOLEST SERPL-MCNC: 126 MG/DL (ref 0–199)
CHOLESTEROL/HDL RATIO: 2.9
CO2 SERPL-SCNC: 26 MMOL/L (ref 21–32)
CREAT SERPL-MCNC: 0.82 MG/DL (ref 0.5–1.05)
EGFRCR SERPLBLD CKD-EPI 2021: >90 ML/MIN/1.73M*2
EOSINOPHIL # BLD AUTO: 0.47 X10*3/UL (ref 0–0.7)
EOSINOPHIL NFR BLD AUTO: 7.1 %
ERYTHROCYTE [DISTWIDTH] IN BLOOD BY AUTOMATED COUNT: 12.8 % (ref 11.5–14.5)
EST. AVERAGE GLUCOSE BLD GHB EST-MCNC: 94 MG/DL
FERRITIN SERPL-MCNC: 48 NG/ML (ref 8–150)
GLUCOSE SERPL-MCNC: 81 MG/DL (ref 74–99)
HBA1C MFR BLD: 4.9 %
HCT VFR BLD AUTO: 37.7 % (ref 36–46)
HDLC SERPL-MCNC: 43 MG/DL
HGB BLD-MCNC: 12 G/DL (ref 12–16)
IMM GRANULOCYTES # BLD AUTO: 0.01 X10*3/UL (ref 0–0.7)
IMM GRANULOCYTES NFR BLD AUTO: 0.2 % (ref 0–0.9)
IRON SATN MFR SERPL: 22 % (ref 25–45)
IRON SERPL-MCNC: 74 UG/DL (ref 35–150)
LDLC SERPL CALC-MCNC: 69 MG/DL
LYMPHOCYTES # BLD AUTO: 2.09 X10*3/UL (ref 1.2–4.8)
LYMPHOCYTES NFR BLD AUTO: 31.6 %
MCH RBC QN AUTO: 29.1 PG (ref 26–34)
MCHC RBC AUTO-ENTMCNC: 31.8 G/DL (ref 32–36)
MCV RBC AUTO: 91 FL (ref 80–100)
MONOCYTES # BLD AUTO: 0.5 X10*3/UL (ref 0.1–1)
MONOCYTES NFR BLD AUTO: 7.6 %
NEUTROPHILS # BLD AUTO: 3.52 X10*3/UL (ref 1.2–7.7)
NEUTROPHILS NFR BLD AUTO: 53 %
NON HDL CHOLESTEROL: 83 MG/DL (ref 0–149)
NRBC BLD-RTO: 0 /100 WBCS (ref 0–0)
PLATELET # BLD AUTO: 255 X10*3/UL (ref 150–450)
POTASSIUM SERPL-SCNC: 4 MMOL/L (ref 3.5–5.3)
PROT SERPL-MCNC: 7 G/DL (ref 6.4–8.2)
RBC # BLD AUTO: 4.13 X10*6/UL (ref 4–5.2)
SODIUM SERPL-SCNC: 139 MMOL/L (ref 136–145)
TIBC SERPL-MCNC: 337 UG/DL (ref 240–445)
TRIGL SERPL-MCNC: 68 MG/DL (ref 0–149)
TSH SERPL-ACNC: 0.73 MIU/L (ref 0.44–3.98)
UIBC SERPL-MCNC: 263 UG/DL (ref 110–370)
VIT B12 SERPL-MCNC: 182 PG/ML (ref 211–911)
VLDL: 14 MG/DL (ref 0–40)
WBC # BLD AUTO: 6.6 X10*3/UL (ref 4.4–11.3)

## 2024-06-13 PROCEDURE — 83036 HEMOGLOBIN GLYCOSYLATED A1C: CPT

## 2024-06-13 PROCEDURE — 85025 COMPLETE CBC W/AUTO DIFF WBC: CPT

## 2024-06-13 PROCEDURE — 76770 US EXAM ABDO BACK WALL COMP: CPT

## 2024-06-13 PROCEDURE — 36415 COLL VENOUS BLD VENIPUNCTURE: CPT

## 2024-06-13 PROCEDURE — 82728 ASSAY OF FERRITIN: CPT

## 2024-06-13 PROCEDURE — 80061 LIPID PANEL: CPT

## 2024-06-13 PROCEDURE — 83540 ASSAY OF IRON: CPT

## 2024-06-13 PROCEDURE — 82607 VITAMIN B-12: CPT

## 2024-06-13 PROCEDURE — 80053 COMPREHEN METABOLIC PANEL: CPT

## 2024-06-13 PROCEDURE — 84443 ASSAY THYROID STIM HORMONE: CPT

## 2024-06-13 PROCEDURE — 83550 IRON BINDING TEST: CPT

## 2024-06-13 PROCEDURE — 82306 VITAMIN D 25 HYDROXY: CPT

## 2024-06-19 ENCOUNTER — APPOINTMENT (OUTPATIENT)
Dept: NEPHROLOGY | Facility: CLINIC | Age: 29
End: 2024-06-19
Payer: COMMERCIAL

## 2024-06-20 ENCOUNTER — APPOINTMENT (OUTPATIENT)
Dept: PRIMARY CARE | Facility: CLINIC | Age: 29
End: 2024-06-20
Payer: COMMERCIAL

## 2024-06-27 ENCOUNTER — APPOINTMENT (OUTPATIENT)
Dept: PRIMARY CARE | Facility: CLINIC | Age: 29
End: 2024-06-27
Payer: COMMERCIAL

## 2024-06-28 ENCOUNTER — LAB (OUTPATIENT)
Dept: LAB | Facility: LAB | Age: 29
End: 2024-06-28
Payer: COMMERCIAL

## 2024-06-28 DIAGNOSIS — R76.8 OTHER SPECIFIED ABNORMAL IMMUNOLOGICAL FINDINGS IN SERUM: ICD-10-CM

## 2024-06-28 DIAGNOSIS — M06.4 INFLAMMATORY POLYARTHROPATHY (MULTI): Primary | ICD-10-CM

## 2024-06-28 DIAGNOSIS — Z79.899 OTHER LONG TERM (CURRENT) DRUG THERAPY: ICD-10-CM

## 2024-06-28 LAB
ALBUMIN SERPL BCP-MCNC: 4 G/DL (ref 3.4–5)
ALP SERPL-CCNC: 66 U/L (ref 33–110)
ALT SERPL W P-5'-P-CCNC: 10 U/L (ref 7–45)
ANION GAP SERPL CALC-SCNC: 9 MMOL/L (ref 10–20)
AST SERPL W P-5'-P-CCNC: 13 U/L (ref 9–39)
BASOPHILS # BLD AUTO: 0.03 X10*3/UL (ref 0–0.1)
BASOPHILS NFR BLD AUTO: 0.4 %
BILIRUB SERPL-MCNC: 0.3 MG/DL (ref 0–1.2)
BUN SERPL-MCNC: 11 MG/DL (ref 6–23)
CALCIUM SERPL-MCNC: 8.6 MG/DL (ref 8.6–10.3)
CHLORIDE SERPL-SCNC: 105 MMOL/L (ref 98–107)
CO2 SERPL-SCNC: 28 MMOL/L (ref 21–32)
CREAT SERPL-MCNC: 0.71 MG/DL (ref 0.5–1.05)
EGFRCR SERPLBLD CKD-EPI 2021: >90 ML/MIN/1.73M*2
EOSINOPHIL # BLD AUTO: 0.23 X10*3/UL (ref 0–0.7)
EOSINOPHIL NFR BLD AUTO: 3.3 %
ERYTHROCYTE [DISTWIDTH] IN BLOOD BY AUTOMATED COUNT: 12.7 % (ref 11.5–14.5)
GLUCOSE SERPL-MCNC: 89 MG/DL (ref 74–99)
HCT VFR BLD AUTO: 34.9 % (ref 36–46)
HGB BLD-MCNC: 10.9 G/DL (ref 12–16)
IMM GRANULOCYTES # BLD AUTO: 0.01 X10*3/UL (ref 0–0.7)
IMM GRANULOCYTES NFR BLD AUTO: 0.1 % (ref 0–0.9)
LYMPHOCYTES # BLD AUTO: 2.34 X10*3/UL (ref 1.2–4.8)
LYMPHOCYTES NFR BLD AUTO: 33.9 %
MCH RBC QN AUTO: 28.9 PG (ref 26–34)
MCHC RBC AUTO-ENTMCNC: 31.2 G/DL (ref 32–36)
MCV RBC AUTO: 93 FL (ref 80–100)
MONOCYTES # BLD AUTO: 0.58 X10*3/UL (ref 0.1–1)
MONOCYTES NFR BLD AUTO: 8.4 %
NEUTROPHILS # BLD AUTO: 3.72 X10*3/UL (ref 1.2–7.7)
NEUTROPHILS NFR BLD AUTO: 53.9 %
NRBC BLD-RTO: 0 /100 WBCS (ref 0–0)
PLATELET # BLD AUTO: 232 X10*3/UL (ref 150–450)
POTASSIUM SERPL-SCNC: 3.5 MMOL/L (ref 3.5–5.3)
PROT SERPL-MCNC: 6.4 G/DL (ref 6.4–8.2)
RBC # BLD AUTO: 3.77 X10*6/UL (ref 4–5.2)
SODIUM SERPL-SCNC: 138 MMOL/L (ref 136–145)
WBC # BLD AUTO: 6.9 X10*3/UL (ref 4.4–11.3)

## 2024-06-28 PROCEDURE — 85025 COMPLETE CBC W/AUTO DIFF WBC: CPT

## 2024-06-28 PROCEDURE — 80053 COMPREHEN METABOLIC PANEL: CPT

## 2024-06-28 PROCEDURE — 36415 COLL VENOUS BLD VENIPUNCTURE: CPT

## 2024-09-21 ENCOUNTER — LAB (OUTPATIENT)
Dept: LAB | Facility: LAB | Age: 29
End: 2024-09-21
Payer: COMMERCIAL

## 2024-09-21 DIAGNOSIS — M06.4 INFLAMMATORY POLYARTHROPATHY (MULTI): Primary | ICD-10-CM

## 2024-09-21 DIAGNOSIS — Z79.899 OTHER LONG TERM (CURRENT) DRUG THERAPY: ICD-10-CM

## 2024-09-21 DIAGNOSIS — R76.8 OTHER SPECIFIED ABNORMAL IMMUNOLOGICAL FINDINGS IN SERUM: ICD-10-CM

## 2024-09-21 LAB
ALBUMIN SERPL BCP-MCNC: 4.3 G/DL (ref 3.4–5)
ALP SERPL-CCNC: 60 U/L (ref 33–110)
ALT SERPL W P-5'-P-CCNC: 9 U/L (ref 7–45)
ANION GAP SERPL CALC-SCNC: 8 MMOL/L (ref 10–20)
AST SERPL W P-5'-P-CCNC: 14 U/L (ref 9–39)
BASOPHILS # BLD AUTO: 0.03 X10*3/UL (ref 0–0.1)
BASOPHILS NFR BLD AUTO: 0.5 %
BILIRUB SERPL-MCNC: 0.6 MG/DL (ref 0–1.2)
BUN SERPL-MCNC: 11 MG/DL (ref 6–23)
CALCIUM SERPL-MCNC: 9.3 MG/DL (ref 8.6–10.3)
CHLORIDE SERPL-SCNC: 108 MMOL/L (ref 98–107)
CO2 SERPL-SCNC: 28 MMOL/L (ref 21–32)
CREAT SERPL-MCNC: 0.8 MG/DL (ref 0.5–1.05)
EGFRCR SERPLBLD CKD-EPI 2021: >90 ML/MIN/1.73M*2
EOSINOPHIL # BLD AUTO: 0.2 X10*3/UL (ref 0–0.7)
EOSINOPHIL NFR BLD AUTO: 3.3 %
ERYTHROCYTE [DISTWIDTH] IN BLOOD BY AUTOMATED COUNT: 12.2 % (ref 11.5–14.5)
GLUCOSE SERPL-MCNC: 86 MG/DL (ref 74–99)
HCT VFR BLD AUTO: 36.2 % (ref 36–46)
HGB BLD-MCNC: 11.8 G/DL (ref 12–16)
IMM GRANULOCYTES # BLD AUTO: 0.01 X10*3/UL (ref 0–0.7)
IMM GRANULOCYTES NFR BLD AUTO: 0.2 % (ref 0–0.9)
LYMPHOCYTES # BLD AUTO: 2 X10*3/UL (ref 1.2–4.8)
LYMPHOCYTES NFR BLD AUTO: 33.4 %
MCH RBC QN AUTO: 29.5 PG (ref 26–34)
MCHC RBC AUTO-ENTMCNC: 32.6 G/DL (ref 32–36)
MCV RBC AUTO: 91 FL (ref 80–100)
MONOCYTES # BLD AUTO: 0.53 X10*3/UL (ref 0.1–1)
MONOCYTES NFR BLD AUTO: 8.9 %
NEUTROPHILS # BLD AUTO: 3.21 X10*3/UL (ref 1.2–7.7)
NEUTROPHILS NFR BLD AUTO: 53.7 %
NRBC BLD-RTO: 0 /100 WBCS (ref 0–0)
PLATELET # BLD AUTO: 255 X10*3/UL (ref 150–450)
POTASSIUM SERPL-SCNC: 4.3 MMOL/L (ref 3.5–5.3)
PROT SERPL-MCNC: 6.7 G/DL (ref 6.4–8.2)
RBC # BLD AUTO: 4 X10*6/UL (ref 4–5.2)
SODIUM SERPL-SCNC: 140 MMOL/L (ref 136–145)
WBC # BLD AUTO: 6 X10*3/UL (ref 4.4–11.3)

## 2024-09-21 PROCEDURE — 80053 COMPREHEN METABOLIC PANEL: CPT

## 2024-09-21 PROCEDURE — 85025 COMPLETE CBC W/AUTO DIFF WBC: CPT

## 2024-09-21 PROCEDURE — 36415 COLL VENOUS BLD VENIPUNCTURE: CPT

## 2024-09-28 DIAGNOSIS — N39.0 RECURRENT UTI: ICD-10-CM

## 2024-11-04 ENCOUNTER — OFFICE VISIT (OUTPATIENT)
Dept: PRIMARY CARE | Facility: CLINIC | Age: 29
End: 2024-11-04
Payer: COMMERCIAL

## 2024-11-04 VITALS
DIASTOLIC BLOOD PRESSURE: 73 MMHG | HEIGHT: 61 IN | SYSTOLIC BLOOD PRESSURE: 102 MMHG | BODY MASS INDEX: 26.43 KG/M2 | TEMPERATURE: 98.6 F | HEART RATE: 95 BPM | WEIGHT: 140 LBS

## 2024-11-04 DIAGNOSIS — J06.9 UPPER RESPIRATORY TRACT INFECTION, UNSPECIFIED TYPE: Primary | ICD-10-CM

## 2024-11-04 PROCEDURE — 1036F TOBACCO NON-USER: CPT | Performed by: INTERNAL MEDICINE

## 2024-11-04 PROCEDURE — 99213 OFFICE O/P EST LOW 20 MIN: CPT | Performed by: INTERNAL MEDICINE

## 2024-11-04 PROCEDURE — 3008F BODY MASS INDEX DOCD: CPT | Performed by: INTERNAL MEDICINE

## 2024-11-04 RX ORDER — COVID-19 ANTIGEN TEST
KIT MISCELLANEOUS
Qty: 1 KIT | Refills: 0 | Status: SHIPPED | OUTPATIENT
Start: 2024-11-04

## 2024-11-04 RX ORDER — AZITHROMYCIN 250 MG/1
TABLET, FILM COATED ORAL
Qty: 6 TABLET | Refills: 0 | Status: SHIPPED | OUTPATIENT
Start: 2024-11-04 | End: 2024-11-09

## 2024-11-04 ASSESSMENT — ENCOUNTER SYMPTOMS
JOINT SWELLING: 0
PSYCHIATRIC NEGATIVE: 1
MUSCULOSKELETAL NEGATIVE: 1
ADENOPATHY: 0
HEMATOLOGIC/LYMPHATIC NEGATIVE: 1
NEUROLOGICAL NEGATIVE: 1
LIGHT-HEADEDNESS: 0
HEADACHES: 0
NUMBNESS: 0
SHORTNESS OF BREATH: 0
ABDOMINAL DISTENTION: 0
CHILLS: 0
COUGH: 1
CONFUSION: 0
ALLERGIC/IMMUNOLOGIC NEGATIVE: 1
EYE DISCHARGE: 0
NAUSEA: 0
CONSTITUTIONAL NEGATIVE: 1
CARDIOVASCULAR NEGATIVE: 1
CONSTIPATION: 0
BACK PAIN: 0
NECK STIFFNESS: 0
GASTROINTESTINAL NEGATIVE: 1
EYES NEGATIVE: 1
RHINORRHEA: 1
WHEEZING: 0
DIARRHEA: 0
FEVER: 0
ENDOCRINE NEGATIVE: 1
PALPITATIONS: 0
ABDOMINAL PAIN: 0
AGITATION: 0
DYSURIA: 0
VOMITING: 0

## 2024-11-04 ASSESSMENT — PATIENT HEALTH QUESTIONNAIRE - PHQ9
2. FEELING DOWN, DEPRESSED OR HOPELESS: NOT AT ALL
SUM OF ALL RESPONSES TO PHQ9 QUESTIONS 1 AND 2: 0
1. LITTLE INTEREST OR PLEASURE IN DOING THINGS: NOT AT ALL

## 2024-11-04 NOTE — PROGRESS NOTES
Subjective   Patient ID: Marisabel Cowan is a 29 y.o. female who presents for Follow-up (PT HERE CO FEVER GLAND SWOLLEN SINUS CONGESTION WITH COUGH ).  SWOLLEN NECK GLANDS  FEVER COLD CHILLS   COUGH X 5 DAYS  RIGHT EAR ACHE        Review of Systems   Constitutional: Negative.  Negative for chills and fever.   HENT:  Positive for ear pain and rhinorrhea. Negative for congestion.    Eyes: Negative.  Negative for discharge.   Respiratory:  Positive for cough. Negative for shortness of breath and wheezing.    Cardiovascular: Negative.  Negative for chest pain, palpitations and leg swelling.   Gastrointestinal: Negative.  Negative for abdominal distention, abdominal pain, constipation, diarrhea, nausea and vomiting.   Endocrine: Negative.    Genitourinary: Negative.  Negative for dysuria and urgency.   Musculoskeletal: Negative.  Negative for back pain, joint swelling and neck stiffness.   Skin: Negative.  Negative for rash.   Allergic/Immunologic: Negative.  Negative for immunocompromised state.   Neurological: Negative.  Negative for light-headedness, numbness and headaches.   Hematological: Negative.  Negative for adenopathy.   Psychiatric/Behavioral: Negative.  Negative for agitation, behavioral problems and confusion.    All other systems reviewed and are negative.      Objective   Physical Exam  Vitals reviewed.   Constitutional:       General: She is not in acute distress.     Appearance: Normal appearance.   HENT:      Head: Normocephalic and atraumatic.      Ears:      Comments: RIGHT TM INJECTED MILDLY     Nose: Nose normal.      Mouth/Throat:      Comments: THROAT MILD ERYTHEMA  Eyes:      Conjunctiva/sclera: Conjunctivae normal.      Pupils: Pupils are equal, round, and reactive to light.   Neck:      Vascular: No carotid bruit.   Cardiovascular:      Rate and Rhythm: Normal rate and regular rhythm.      Pulses: Normal pulses.      Heart sounds:      No gallop.   Pulmonary:      Effort: Pulmonary effort is  "normal. No respiratory distress.      Breath sounds: Normal breath sounds. No wheezing.   Abdominal:      General: Bowel sounds are normal.      Palpations: Abdomen is soft.      Tenderness: There is no abdominal tenderness.   Musculoskeletal:         General: Normal range of motion.      Cervical back: Normal range of motion. No rigidity.   Lymphadenopathy:      Cervical: Cervical adenopathy (MILDLY ENLARGED AND TENDER) present.   Skin:     General: Skin is warm.      Findings: No rash.   Neurological:      General: No focal deficit present.      Mental Status: She is alert and oriented to person, place, and time.   Psychiatric:         Mood and Affect: Mood normal.         Behavior: Behavior normal.       /73 (BP Location: Left arm, Patient Position: Sitting)   Pulse 95   Temp 37 °C (98.6 °F)   Ht 1.549 m (5' 1\")   Wt 63.5 kg (140 lb)   BMI 26.45 kg/m²    Hemoglobin A1C   Date/Time Value Ref Range Status   06/13/2024 07:15 AM 4.9 see below % Final     Assessment/Plan   Problem List Items Addressed This Visit    None  Visit Diagnoses       Upper respiratory tract infection, unspecified type    -  Primary    Relevant Medications    COVID-19 antigen test (COVID-19 At-Home Test) kit    azithromycin (Zithromax) 250 mg tablet             PT. WAS INSTRUCTED TO INCREASE FLUID INTAKE ,TAKE TYLENOL 650 MG PO Q6H/PRN FOR PAIN OR FEVER AND TAKE ROBITUSSIN OTC 2 TSP Q 6H/PRN FOR COUGH.    SEES [facility] Q 3 MO WITH BW  FU 6 MO LOKESH  "

## 2024-11-18 ENCOUNTER — LAB (OUTPATIENT)
Dept: LAB | Facility: LAB | Age: 29
End: 2024-11-18
Payer: COMMERCIAL

## 2024-11-18 DIAGNOSIS — R76.8 OTHER SPECIFIED ABNORMAL IMMUNOLOGICAL FINDINGS IN SERUM: ICD-10-CM

## 2024-11-18 DIAGNOSIS — Z79.899 OTHER LONG TERM (CURRENT) DRUG THERAPY: ICD-10-CM

## 2024-11-18 DIAGNOSIS — M06.4 INFLAMMATORY POLYARTHROPATHY (MULTI): ICD-10-CM

## 2024-11-18 LAB
ALBUMIN SERPL BCP-MCNC: 4.5 G/DL (ref 3.4–5)
ALP SERPL-CCNC: 58 U/L (ref 33–110)
ALT SERPL W P-5'-P-CCNC: 8 U/L (ref 7–45)
ANION GAP SERPL CALC-SCNC: 11 MMOL/L (ref 10–20)
AST SERPL W P-5'-P-CCNC: 13 U/L (ref 9–39)
BASOPHILS # BLD AUTO: 0.03 X10*3/UL (ref 0–0.1)
BASOPHILS NFR BLD AUTO: 0.4 %
BILIRUB SERPL-MCNC: 0.5 MG/DL (ref 0–1.2)
BUN SERPL-MCNC: 13 MG/DL (ref 6–23)
CALCIUM SERPL-MCNC: 9.3 MG/DL (ref 8.6–10.3)
CHLORIDE SERPL-SCNC: 104 MMOL/L (ref 98–107)
CO2 SERPL-SCNC: 29 MMOL/L (ref 21–32)
CREAT SERPL-MCNC: 0.82 MG/DL (ref 0.5–1.05)
EGFRCR SERPLBLD CKD-EPI 2021: >90 ML/MIN/1.73M*2
EOSINOPHIL # BLD AUTO: 0.17 X10*3/UL (ref 0–0.7)
EOSINOPHIL NFR BLD AUTO: 2.3 %
ERYTHROCYTE [DISTWIDTH] IN BLOOD BY AUTOMATED COUNT: 12.3 % (ref 11.5–14.5)
GLUCOSE SERPL-MCNC: 79 MG/DL (ref 74–99)
HCT VFR BLD AUTO: 37.1 % (ref 36–46)
HGB BLD-MCNC: 11.7 G/DL (ref 12–16)
IMM GRANULOCYTES # BLD AUTO: 0.01 X10*3/UL (ref 0–0.7)
IMM GRANULOCYTES NFR BLD AUTO: 0.1 % (ref 0–0.9)
LYMPHOCYTES # BLD AUTO: 2.43 X10*3/UL (ref 1.2–4.8)
LYMPHOCYTES NFR BLD AUTO: 32.5 %
MCH RBC QN AUTO: 28.7 PG (ref 26–34)
MCHC RBC AUTO-ENTMCNC: 31.5 G/DL (ref 32–36)
MCV RBC AUTO: 91 FL (ref 80–100)
MONOCYTES # BLD AUTO: 0.54 X10*3/UL (ref 0.1–1)
MONOCYTES NFR BLD AUTO: 7.2 %
NEUTROPHILS # BLD AUTO: 4.29 X10*3/UL (ref 1.2–7.7)
NEUTROPHILS NFR BLD AUTO: 57.5 %
NRBC BLD-RTO: 0 /100 WBCS (ref 0–0)
PLATELET # BLD AUTO: 282 X10*3/UL (ref 150–450)
POTASSIUM SERPL-SCNC: 3.9 MMOL/L (ref 3.5–5.3)
PROT SERPL-MCNC: 6.8 G/DL (ref 6.4–8.2)
RBC # BLD AUTO: 4.08 X10*6/UL (ref 4–5.2)
SODIUM SERPL-SCNC: 140 MMOL/L (ref 136–145)
WBC # BLD AUTO: 7.5 X10*3/UL (ref 4.4–11.3)

## 2024-11-18 PROCEDURE — 85025 COMPLETE CBC W/AUTO DIFF WBC: CPT

## 2024-11-18 PROCEDURE — 80053 COMPREHEN METABOLIC PANEL: CPT

## 2024-11-18 PROCEDURE — 36415 COLL VENOUS BLD VENIPUNCTURE: CPT

## 2024-12-16 ENCOUNTER — OFFICE VISIT (OUTPATIENT)
Dept: PRIMARY CARE | Facility: CLINIC | Age: 29
End: 2024-12-16
Payer: COMMERCIAL

## 2024-12-16 VITALS
SYSTOLIC BLOOD PRESSURE: 115 MMHG | WEIGHT: 141 LBS | BODY MASS INDEX: 26.62 KG/M2 | HEIGHT: 61 IN | DIASTOLIC BLOOD PRESSURE: 84 MMHG | HEART RATE: 76 BPM

## 2024-12-16 DIAGNOSIS — D23.4 DERMOID CYST OF SCALP: ICD-10-CM

## 2024-12-16 DIAGNOSIS — R59.1 LYMPHADENOPATHY: Primary | ICD-10-CM

## 2024-12-16 DIAGNOSIS — R53.83 OTHER FATIGUE: ICD-10-CM

## 2024-12-16 DIAGNOSIS — R79.89 HIGH SERUM VITAMIN D: ICD-10-CM

## 2024-12-16 DIAGNOSIS — M06.9 RHEUMATOID ARTHRITIS OF OTHER SITE, UNSPECIFIED WHETHER RHEUMATOID FACTOR PRESENT (MULTI): ICD-10-CM

## 2024-12-16 PROCEDURE — 1036F TOBACCO NON-USER: CPT | Performed by: PHYSICIAN ASSISTANT

## 2024-12-16 PROCEDURE — 99214 OFFICE O/P EST MOD 30 MIN: CPT | Performed by: PHYSICIAN ASSISTANT

## 2024-12-16 PROCEDURE — 3008F BODY MASS INDEX DOCD: CPT | Performed by: PHYSICIAN ASSISTANT

## 2024-12-16 RX ORDER — CEPHALEXIN 500 MG/1
500 CAPSULE ORAL 3 TIMES DAILY
Qty: 30 CAPSULE | Refills: 0 | Status: SHIPPED | OUTPATIENT
Start: 2024-12-16 | End: 2024-12-26

## 2024-12-16 ASSESSMENT — ENCOUNTER SYMPTOMS
ABDOMINAL PAIN: 0
SLEEP DISTURBANCE: 0
SORE THROAT: 0
CONSTIPATION: 0
VOMITING: 0
RHINORRHEA: 0
SHORTNESS OF BREATH: 0
NECK PAIN: 0
BACK PAIN: 0
COUGH: 0
PALPITATIONS: 0
NUMBNESS: 0
WOUND: 0
SINUS PAIN: 0
DIARRHEA: 0
HEADACHES: 0
TREMORS: 0
ARTHRALGIAS: 1
CONFUSION: 0
FLANK PAIN: 0
FEVER: 0
NAUSEA: 0
FREQUENCY: 0
DIZZINESS: 0
CHEST TIGHTNESS: 0
BRUISES/BLEEDS EASILY: 0
EYE DISCHARGE: 0
WHEEZING: 0
CHILLS: 0
EYE REDNESS: 0
ADENOPATHY: 1
FATIGUE: 1

## 2024-12-16 NOTE — PROGRESS NOTES
"Subjective   Patient ID: Marisabel Cowan is a 29 y.o. female who presents for Follow-up (PT STATES SHE'S FOUND A COUPLE \"KNOT\" LIKE BUMPS ON HER HEAD THAT HAVE GROWN BIGGER IN SIZE SHE'S NOTICED. AND RECENTLY STATED SHE HAS NOTICED THESE KNOTS ARE ON THE RIGHT SIDE OF NECK. PT STATES WHEN SHE BRUSHES/TOUCHES HER SCALP IT IS PAINFUL )    HPI  Patient presents today as bessy Melton patient for     Posterior scalp nodules x 2 weeks.  Tender and maybe larger in size   R lateral neck nodule x 2 since this AM and tender  She states she does deal with auto-immune issues but denies being sick recently or ingrown hair follicle, allergies, etc     Med check   RA - on meds     Patient Active Problem List   Diagnosis    Carpal tunnel syndrome of left wrist    Childhood asthma (HHS-HCC)    Colon polyp    Easy bruising    Edema    Fatigue    Generalized body aches    Irritable bowel syndrome with both constipation and diarrhea    Knee pain, bilateral    Migraine headache    Multiple joint pain    Palpitations    Positive ADRIAN (antinuclear antibody)    Rheumatoid arthritis    Sinus tachycardia    Nausea    Dizziness    Hypotensive episode    Anxiety    Excessive anger    Insomnia    Vitamin B12 deficiency    Urine frequency    Recurrent UTI    Urinary tract infection without hematuria    Tooth infection    Low iron    Function kidney decreased       Review of Systems   Constitutional:  Positive for fatigue. Negative for chills and fever.   HENT:  Negative for congestion, rhinorrhea, sinus pain, sore throat and tinnitus.    Eyes:  Negative for discharge, redness and visual disturbance.   Respiratory:  Negative for cough, chest tightness, shortness of breath and wheezing.    Cardiovascular:  Negative for chest pain, palpitations and leg swelling.   Gastrointestinal:  Negative for abdominal pain, constipation, diarrhea, nausea and vomiting.   Endocrine: Negative for cold intolerance and heat intolerance.   Genitourinary:  Negative for " "flank pain, frequency and urgency.   Musculoskeletal:  Positive for arthralgias. Negative for back pain, gait problem and neck pain.   Skin:  Negative for rash and wound.        Nodules scalp and neck    Neurological:  Negative for dizziness, tremors, syncope, numbness and headaches.   Hematological:  Positive for adenopathy. Does not bruise/bleed easily.   Psychiatric/Behavioral:  Negative for confusion, sleep disturbance and suicidal ideas.        Past Medical History:   Diagnosis Date    Other specified postprocedural states     History of Papanicolaou smear       Past Surgical History:   Procedure Laterality Date    COLONOSCOPY  08/18/2022    REPEAT 3 YEARS. A.  RANDOM COLON BIOPSIES: -- COLONIC MUCOSA, NO PATHOLOGICAL DIAGNOSIS.  B.  RECTAL POLYP: -- HYPERPLASTIC POLYP WITH EXTENSIVE CAUTERY ARTIFACT.    TONSILLECTOMY      WISDOM TOOTH EXTRACTION      X 4       Family History   Problem Relation Name Age of Onset    Hypertension Father      Colon cancer Other      Diabetes Other GRANDPARENT        Social History     Tobacco Use    Smoking status: Never    Smokeless tobacco: Never   Vaping Use    Vaping status: Never Used   Substance Use Topics    Alcohol use: Not Currently    Drug use: Never       Allergies   Allergen Reactions    Penicillins Anaphylaxis, Hives and Swelling    Bee Pollen Other    Buspar [Buspirone] Dizziness    House Dust Other    Animal Dander Other       Current Outpatient Medications   Medication Sig Dispense Refill    BD Tuberculin Syringe 1 mL 27 x 1/2\" syringe USE ONCE A WEEK      COVID-19 antigen test (COVID-19 At-Home Test) kit As DIRECTED 1 kit 0    cyanocobalamin (Vitamin B-12) 1,000 mcg/mL injection Inject 1 mL (1,000 mcg) into the muscle every 30 (thirty) days.      cyanocobalamin, vitamin B-12, 1,000 mcg/mL kit Inject 1,000 mcg as directed every 28 (twenty-eight) days. 3 kit 3    methenamine hippurate (Hiprex) 1 gram tablet Take 1 tablet (1 g) by mouth 2 times a day. 180 each 3    " "sulfaSALAzine (Azulfidine) 500 mg DR tablet Take 1 tablet (500 mg) by mouth 2 times a day. [facility]      traMADol (Ultram) 50 mg tablet        No current facility-administered medications for this visit.       Objective   /84   Pulse 76   Ht 1.549 m (5' 1\")   Wt 64 kg (141 lb)   BMI 26.64 kg/m²     Physical Exam  Vitals reviewed.   Constitutional:       Appearance: Normal appearance. She is normal weight.   HENT:      Head: Normocephalic.      Right Ear: External ear normal.      Left Ear: External ear normal.      Nose: Nose normal. No congestion or rhinorrhea.      Mouth/Throat:      Mouth: Mucous membranes are moist.   Eyes:      Extraocular Movements: Extraocular movements intact.      Conjunctiva/sclera: Conjunctivae normal.      Pupils: Pupils are equal, round, and reactive to light.   Cardiovascular:      Rate and Rhythm: Normal rate and regular rhythm.      Pulses: Normal pulses.   Pulmonary:      Effort: Pulmonary effort is normal.      Breath sounds: Normal breath sounds.   Abdominal:      General: Bowel sounds are normal.      Palpations: Abdomen is soft.      Tenderness: There is no abdominal tenderness. There is no right CVA tenderness or left CVA tenderness.   Musculoskeletal:         General: No tenderness. Normal range of motion.      Cervical back: Normal range of motion and neck supple. No tenderness.   Skin:     General: Skin is warm and dry.      Comments: Bilat posterior nodule she notes is I believe the indentation over the suture line on both side (lamboid suture)  I do note more of a raised soft tissue nodule in the central region of the back of her scalp that appears more of a fatty tumor or cyst     R posterior occipital lymphadenopathy - tender    Neurological:      General: No focal deficit present.      Mental Status: She is alert and oriented to person, place, and time.   Psychiatric:         Mood and Affect: Mood normal.         Behavior: Behavior normal.       Testing "     Reviewed labs on file- elevated vit D - suggest repeat D level   She states she has stopped supplement     Impression    MDM    1) COMPLEXITY: 1 UNDIAGNOSED NEW PROBLEM WITH UNCERTAIN PROGNOSIS  2)DATA: TESTS INTERPRETED AND OR ORDERED, TOOK INDEPENDENT HISTORY OR RECORDS REVIEWED  3)RISK: MODERATE RISK DUE TO NATURE OF MEDICAL CONDITIONS/COMORBIDITY OR MEDICATIONS ORDERED OR SURGICAL OR PROCEDURE REFERRAL, .       Reviewed labs and Testing on file   Patient to follow diet low in cholesterol, fat, and sodium.    Patient is advised to increase Exercise.  Patient is recommended to lose weight.  Reviewed Meds and discussed common side effects  Continue as directed   I suspect she has reactive lymphadenopathy in the neck - will do trial of Abx but if little relief suggest US   I suspect  cyst or fatty lipoma in the posterior central aspect of the scalp   Patient is strongly advised to be compliant with recommendations.    Return to Clinic sooner if needed.  Patient denies further questions/concerns at this time     Assessment/Plan   Problem List Items Addressed This Visit             ICD-10-CM    Fatigue R53.83    Rheumatoid arthritis M06.9     Other Visit Diagnoses         Codes    Lymphadenopathy    -  Primary R59.1    Relevant Medications    cephalexin (Keflex) 500 mg capsule    High serum vitamin D     R79.89    Relevant Orders    Vitamin D 25-Hydroxy,Total (for eval of Vitamin D levels)    Dermoid cyst of scalp     D23.4             FU in 2-4 weeks with lymphadenopathy check

## 2024-12-20 ENCOUNTER — TELEPHONE (OUTPATIENT)
Dept: PRIMARY CARE | Facility: CLINIC | Age: 29
End: 2024-12-20
Payer: COMMERCIAL

## 2024-12-20 DIAGNOSIS — B37.9 YEAST INFECTION: Primary | ICD-10-CM

## 2024-12-20 RX ORDER — FLUCONAZOLE 150 MG/1
150 TABLET ORAL ONCE
Qty: 3 TABLET | Refills: 0 | Status: SHIPPED | OUTPATIENT
Start: 2024-12-20 | End: 2024-12-20

## 2024-12-20 NOTE — TELEPHONE ENCOUNTER
PT CALLED STATES THAT SHE WAS PUT ON ANTIBIOTIC ON 12/16/24, IT IS NOT WORKING AND NOW SHE HAS A YEAST INFECTION. PT ASKED FOR TREATMENT FOR YEAST INFECTION AND DIFFERENT ANTIBIOTIC. PLEASE ADVISE.

## 2025-01-09 ENCOUNTER — APPOINTMENT (OUTPATIENT)
Dept: PRIMARY CARE | Facility: CLINIC | Age: 30
End: 2025-01-09
Payer: COMMERCIAL

## 2025-04-29 ENCOUNTER — APPOINTMENT (OUTPATIENT)
Dept: RADIOLOGY | Facility: HOSPITAL | Age: 30
End: 2025-04-29
Payer: COMMERCIAL

## 2025-04-29 ENCOUNTER — HOSPITAL ENCOUNTER (EMERGENCY)
Facility: HOSPITAL | Age: 30
Discharge: HOME | End: 2025-04-29
Attending: EMERGENCY MEDICINE
Payer: COMMERCIAL

## 2025-04-29 VITALS
WEIGHT: 138 LBS | HEART RATE: 80 BPM | SYSTOLIC BLOOD PRESSURE: 109 MMHG | HEIGHT: 61 IN | BODY MASS INDEX: 26.06 KG/M2 | OXYGEN SATURATION: 100 % | RESPIRATION RATE: 17 BRPM | TEMPERATURE: 97.7 F | DIASTOLIC BLOOD PRESSURE: 78 MMHG

## 2025-04-29 DIAGNOSIS — N83.202 CYST OF LEFT OVARY: Primary | ICD-10-CM

## 2025-04-29 LAB
ANION GAP SERPL CALC-SCNC: 10 MMOL/L (ref 10–20)
APPEARANCE UR: ABNORMAL
BACTERIA #/AREA URNS AUTO: ABNORMAL /HPF
BASOPHILS # BLD AUTO: 0.02 X10*3/UL (ref 0–0.1)
BASOPHILS NFR BLD AUTO: 0.2 %
BILIRUB UR STRIP.AUTO-MCNC: NEGATIVE MG/DL
BUN SERPL-MCNC: 14 MG/DL (ref 6–23)
CALCIUM SERPL-MCNC: 9.3 MG/DL (ref 8.6–10.3)
CHLORIDE SERPL-SCNC: 106 MMOL/L (ref 98–107)
CO2 SERPL-SCNC: 26 MMOL/L (ref 21–32)
COLOR UR: YELLOW
CREAT SERPL-MCNC: 0.85 MG/DL (ref 0.5–1.05)
EGFRCR SERPLBLD CKD-EPI 2021: >90 ML/MIN/1.73M*2
EOSINOPHIL # BLD AUTO: 0.29 X10*3/UL (ref 0–0.7)
EOSINOPHIL NFR BLD AUTO: 2.8 %
ERYTHROCYTE [DISTWIDTH] IN BLOOD BY AUTOMATED COUNT: 13.1 % (ref 11.5–14.5)
GLUCOSE SERPL-MCNC: 84 MG/DL (ref 74–99)
GLUCOSE UR STRIP.AUTO-MCNC: NORMAL MG/DL
HCT VFR BLD AUTO: 37.1 % (ref 36–46)
HGB BLD-MCNC: 11.8 G/DL (ref 12–16)
HOLD SPECIMEN: NORMAL
IMM GRANULOCYTES # BLD AUTO: 0.03 X10*3/UL (ref 0–0.7)
IMM GRANULOCYTES NFR BLD AUTO: 0.3 % (ref 0–0.9)
KETONES UR STRIP.AUTO-MCNC: NEGATIVE MG/DL
LEUKOCYTE ESTERASE UR QL STRIP.AUTO: ABNORMAL
LYMPHOCYTES # BLD AUTO: 2.31 X10*3/UL (ref 1.2–4.8)
LYMPHOCYTES NFR BLD AUTO: 22.5 %
MCH RBC QN AUTO: 28.6 PG (ref 26–34)
MCHC RBC AUTO-ENTMCNC: 31.8 G/DL (ref 32–36)
MCV RBC AUTO: 90 FL (ref 80–100)
MONOCYTES # BLD AUTO: 0.94 X10*3/UL (ref 0.1–1)
MONOCYTES NFR BLD AUTO: 9.2 %
MUCOUS THREADS #/AREA URNS AUTO: ABNORMAL /LPF
NEUTROPHILS # BLD AUTO: 6.67 X10*3/UL (ref 1.2–7.7)
NEUTROPHILS NFR BLD AUTO: 65 %
NITRITE UR QL STRIP.AUTO: NEGATIVE
NRBC BLD-RTO: 0 /100 WBCS (ref 0–0)
PH UR STRIP.AUTO: 6.5 [PH]
PLATELET # BLD AUTO: 207 X10*3/UL (ref 150–450)
POTASSIUM SERPL-SCNC: 3.9 MMOL/L (ref 3.5–5.3)
PROT UR STRIP.AUTO-MCNC: ABNORMAL MG/DL
RBC # BLD AUTO: 4.13 X10*6/UL (ref 4–5.2)
RBC # UR STRIP.AUTO: ABNORMAL MG/DL
RBC #/AREA URNS AUTO: ABNORMAL /HPF
SODIUM SERPL-SCNC: 138 MMOL/L (ref 136–145)
SP GR UR STRIP.AUTO: 1.03
SQUAMOUS #/AREA URNS AUTO: ABNORMAL /HPF
UROBILINOGEN UR STRIP.AUTO-MCNC: NORMAL MG/DL
WBC # BLD AUTO: 10.3 X10*3/UL (ref 4.4–11.3)
WBC #/AREA URNS AUTO: >50 /HPF

## 2025-04-29 PROCEDURE — 74177 CT ABD & PELVIS W/CONTRAST: CPT | Mod: FOREIGN READ | Performed by: RADIOLOGY

## 2025-04-29 PROCEDURE — 36415 COLL VENOUS BLD VENIPUNCTURE: CPT | Performed by: EMERGENCY MEDICINE

## 2025-04-29 PROCEDURE — 2500000004 HC RX 250 GENERAL PHARMACY W/ HCPCS (ALT 636 FOR OP/ED): Performed by: EMERGENCY MEDICINE

## 2025-04-29 PROCEDURE — 80048 BASIC METABOLIC PNL TOTAL CA: CPT | Performed by: EMERGENCY MEDICINE

## 2025-04-29 PROCEDURE — 87086 URINE CULTURE/COLONY COUNT: CPT | Mod: SAMLAB | Performed by: EMERGENCY MEDICINE

## 2025-04-29 PROCEDURE — 99285 EMERGENCY DEPT VISIT HI MDM: CPT | Mod: 25 | Performed by: EMERGENCY MEDICINE

## 2025-04-29 PROCEDURE — 85025 COMPLETE CBC W/AUTO DIFF WBC: CPT | Performed by: EMERGENCY MEDICINE

## 2025-04-29 PROCEDURE — 96374 THER/PROPH/DIAG INJ IV PUSH: CPT | Mod: 59

## 2025-04-29 PROCEDURE — 2550000001 HC RX 255 CONTRASTS: Performed by: EMERGENCY MEDICINE

## 2025-04-29 PROCEDURE — 81001 URINALYSIS AUTO W/SCOPE: CPT | Performed by: EMERGENCY MEDICINE

## 2025-04-29 PROCEDURE — 96361 HYDRATE IV INFUSION ADD-ON: CPT

## 2025-04-29 PROCEDURE — 74177 CT ABD & PELVIS W/CONTRAST: CPT

## 2025-04-29 RX ORDER — KETOROLAC TROMETHAMINE 30 MG/ML
15 INJECTION, SOLUTION INTRAMUSCULAR; INTRAVENOUS ONCE
Status: COMPLETED | OUTPATIENT
Start: 2025-04-29 | End: 2025-04-29

## 2025-04-29 RX ORDER — ETODOLAC 400 MG/1
400 TABLET, FILM COATED ORAL 2 TIMES DAILY
Qty: 14 TABLET | Refills: 0 | Status: SHIPPED | OUTPATIENT
Start: 2025-04-29 | End: 2025-05-06

## 2025-04-29 RX ADMIN — IOHEXOL 68 ML: 350 INJECTION, SOLUTION INTRAVENOUS at 07:08

## 2025-04-29 RX ADMIN — KETOROLAC TROMETHAMINE 15 MG: 30 INJECTION, SOLUTION INTRAMUSCULAR at 06:30

## 2025-04-29 RX ADMIN — SODIUM CHLORIDE 1000 ML: 0.9 INJECTION, SOLUTION INTRAVENOUS at 06:31

## 2025-04-29 ASSESSMENT — PAIN DESCRIPTION - ORIENTATION
ORIENTATION: LEFT;LOWER
ORIENTATION: LEFT;LOWER

## 2025-04-29 ASSESSMENT — PAIN SCALES - GENERAL
PAINLEVEL_OUTOF10: 3
PAINLEVEL_OUTOF10: 5 - MODERATE PAIN
PAINLEVEL_OUTOF10: 3
PAINLEVEL_OUTOF10: 8

## 2025-04-29 ASSESSMENT — COLUMBIA-SUICIDE SEVERITY RATING SCALE - C-SSRS
2. HAVE YOU ACTUALLY HAD ANY THOUGHTS OF KILLING YOURSELF?: NO
6. HAVE YOU EVER DONE ANYTHING, STARTED TO DO ANYTHING, OR PREPARED TO DO ANYTHING TO END YOUR LIFE?: NO
1. IN THE PAST MONTH, HAVE YOU WISHED YOU WERE DEAD OR WISHED YOU COULD GO TO SLEEP AND NOT WAKE UP?: NO

## 2025-04-29 ASSESSMENT — PAIN DESCRIPTION - LOCATION
LOCATION: ABDOMEN
LOCATION: ABDOMEN

## 2025-04-29 ASSESSMENT — PAIN DESCRIPTION - ONSET: ONSET: ONGOING

## 2025-04-29 ASSESSMENT — PAIN - FUNCTIONAL ASSESSMENT
PAIN_FUNCTIONAL_ASSESSMENT: 0-10
PAIN_FUNCTIONAL_ASSESSMENT: 0-10

## 2025-04-29 ASSESSMENT — PAIN DESCRIPTION - PAIN TYPE
TYPE: ACUTE PAIN
TYPE: ACUTE PAIN

## 2025-04-29 ASSESSMENT — PAIN DESCRIPTION - FREQUENCY: FREQUENCY: CONSTANT/CONTINUOUS

## 2025-04-29 ASSESSMENT — PAIN DESCRIPTION - PROGRESSION: CLINICAL_PROGRESSION: GRADUALLY IMPROVING

## 2025-04-29 NOTE — ED PROVIDER NOTES
30-year-old female with history of ovarian cyst presents with a chief complaint of left lower quadrant abdominal pain starting approximately 2 hours ago which is worse with standing.  Currently she is lying down and her symptoms are not too bad.  Some nausea earlier but is not nauseous at the moment.  She declined narcotic pain medication and nausea medicine at the moment.  History of tubal ligation.  No change in bowel or bladder habits.  No frequency urgency or dysuria.         Review of Systems     Physical Exam  Vitals and nursing note reviewed.   Constitutional:       General: She is not in acute distress.     Appearance: She is well-developed.   HENT:      Head: Normocephalic and atraumatic.   Eyes:      Conjunctiva/sclera: Conjunctivae normal.   Cardiovascular:      Rate and Rhythm: Normal rate and regular rhythm.      Heart sounds: No murmur heard.  Pulmonary:      Effort: Pulmonary effort is normal. No respiratory distress.      Breath sounds: Normal breath sounds.   Abdominal:      Palpations: Abdomen is soft.      Tenderness: There is no abdominal tenderness.   Musculoskeletal:         General: No swelling.      Cervical back: Neck supple.   Skin:     General: Skin is warm and dry.      Capillary Refill: Capillary refill takes less than 2 seconds.   Neurological:      Mental Status: She is alert.   Psychiatric:         Mood and Affect: Mood normal.          Labs Reviewed   URINALYSIS WITH REFLEX CULTURE AND MICROSCOPIC    Narrative:     The following orders were created for panel order Urinalysis with Reflex Culture and Microscopic.  Procedure                               Abnormality         Status                     ---------                               -----------         ------                     Urinalysis with Reflex C...[[id number]]                                                 Extra Urine Gray Tube[[id number]]                                                         Please view results for  these tests on the individual orders.   CBC WITH AUTO DIFFERENTIAL   BASIC METABOLIC PANEL   URINALYSIS WITH REFLEX CULTURE AND MICROSCOPIC   EXTRA URINE GRAY TUBE        CT abdomen pelvis w IV contrast    (Results Pending)        Procedures     Medical Decision Making  30-year-old female with history of ovarian cyst presents with a chief complaint of left lower quadrant abdominal pain starting approximately 2 hours ago which is worse with standing.  Currently she is lying down and her symptoms are not too bad.  Some nausea earlier but is not nauseous at the moment.  She declined narcotic pain medication and nausea medicine at the moment.  History of tubal ligation.  No change in bowel or bladder habits.  No frequency urgency or dysuria.    DDx: Urinary tract infection, kidney stone, ovarian cyst, diverticulitis, mesenteric adenitis                         Decision Making  30-year-old female with history of ovarian cyst presents with a chief complaint of left lower quadrant abdominal pain starting approximately 2 hours ago which is worse with standing.  Currently she is lying down and her symptoms are not too bad.  Some nausea earlier but is not nauseous at the moment.  She declined narcotic pain medication and nausea medicine at the moment.  History of tubal ligation.  No change in bowel or bladder habits.  No frequency urgency or dysuria.    DDx: Urinary tract infection, kidney stone, ovarian cyst, diverticulitis, mesenteric adenitis         Diagnoses as of 05/08/25 0511   Cyst of left ovary                    Taz Duval MD  05/08/25 0512

## 2025-04-29 NOTE — PROGRESS NOTES
Emergency Medicine Transition of Care Note.    I received Marisabel Cowan in signout from Dr. Duval.  Please see the previous ED provider note for all HPI, PE and MDM up to the time of signout at 0700. This is in addition to the primary record.    In brief Marisabel Cowan is an 30 y.o. female presenting for   Chief Complaint   Patient presents with    Abdominal Pain     LLQ pain beginning 2hr PTA, denies any other symptoms. Hx Ovarian Cysts     At the time of signout we were awaiting: CT    Diagnoses as of 04/29/25 0807   Cyst of left ovary       Medical Decision Making  Patient was endorsed to me from the overnight physician.  Please see their note for prior history and physical exam details.  The patient's CAT scan was noted to reveal a left-sided ovarian cyst.  Patient has a history of similar.  Urinalysis shows 1+ bacteria however the patient is not endorsing dysuria so therefore I feel sending the patient's urinalysis for formal culture would be more appropriate than treating her for cystitis at this time.  Patient had relief with Toradol here and is currently resting comfortably.  My suspicion for ovarian torsion is low.  Patient will be discharged with a prescription for NSAIDs, referral to GYN, and given a work note.  Rest today and limit activity as tolerated.  Return for any other ongoing concerns or worsening symptoms.    Final diagnoses:   [N83.202] Cyst of left ovary           Procedure  Procedures    aJiron Coronado DO

## 2025-04-29 NOTE — Clinical Note
Marisabel Cowan was seen and treated in our emergency department on 4/29/2025.  She may return to work on 04/30/2025.       If you have any questions or concerns, please don't hesitate to call.      Jairon Coronado, DO Statement Selected

## 2025-04-30 LAB — BACTERIA UR CULT: NORMAL

## 2025-05-08 ENCOUNTER — APPOINTMENT (OUTPATIENT)
Dept: PRIMARY CARE | Facility: CLINIC | Age: 30
End: 2025-05-08
Payer: COMMERCIAL

## 2025-05-08 VITALS
HEIGHT: 61 IN | WEIGHT: 137.6 LBS | DIASTOLIC BLOOD PRESSURE: 75 MMHG | HEART RATE: 82 BPM | BODY MASS INDEX: 25.98 KG/M2 | SYSTOLIC BLOOD PRESSURE: 107 MMHG

## 2025-05-08 DIAGNOSIS — M05.79 RHEUMATOID ARTHRITIS INVOLVING MULTIPLE SITES WITH POSITIVE RHEUMATOID FACTOR (MULTI): ICD-10-CM

## 2025-05-08 DIAGNOSIS — J45.20 MILD INTERMITTENT CHILDHOOD ASTHMA WITHOUT COMPLICATION (HHS-HCC): ICD-10-CM

## 2025-05-08 DIAGNOSIS — E61.1 LOW IRON: ICD-10-CM

## 2025-05-08 DIAGNOSIS — K63.5 POLYP OF COLON, UNSPECIFIED PART OF COLON, UNSPECIFIED TYPE: ICD-10-CM

## 2025-05-08 DIAGNOSIS — E53.8 VITAMIN B12 DEFICIENCY: ICD-10-CM

## 2025-05-08 DIAGNOSIS — Z00.00 WELLNESS EXAMINATION: Primary | ICD-10-CM

## 2025-05-08 DIAGNOSIS — E55.9 VITAMIN D DEFICIENCY: ICD-10-CM

## 2025-05-08 PROBLEM — R42 DIZZINESS: Status: RESOLVED | Noted: 2023-10-19 | Resolved: 2025-05-08

## 2025-05-08 PROBLEM — R53.83 FATIGUE: Status: RESOLVED | Noted: 2023-04-27 | Resolved: 2025-05-08

## 2025-05-08 PROBLEM — R35.0 URINE FREQUENCY: Status: RESOLVED | Noted: 2023-12-14 | Resolved: 2025-05-08

## 2025-05-08 PROBLEM — M06.9 RHEUMATOID ARTHRITIS: Status: RESOLVED | Noted: 2023-04-27 | Resolved: 2025-05-08

## 2025-05-08 PROBLEM — J45.909 CHILDHOOD ASTHMA (HHS-HCC): Status: RESOLVED | Noted: 2023-04-27 | Resolved: 2025-05-08

## 2025-05-08 PROBLEM — N39.0 URINARY TRACT INFECTION WITHOUT HEMATURIA: Status: RESOLVED | Noted: 2023-12-14 | Resolved: 2025-05-08

## 2025-05-08 PROBLEM — R60.9 EDEMA: Status: RESOLVED | Noted: 2023-04-27 | Resolved: 2025-05-08

## 2025-05-08 PROBLEM — N39.0 RECURRENT UTI: Status: RESOLVED | Noted: 2023-12-14 | Resolved: 2025-05-08

## 2025-05-08 PROCEDURE — 3008F BODY MASS INDEX DOCD: CPT | Performed by: NURSE PRACTITIONER

## 2025-05-08 PROCEDURE — 99395 PREV VISIT EST AGE 18-39: CPT | Performed by: NURSE PRACTITIONER

## 2025-05-08 PROCEDURE — 1036F TOBACCO NON-USER: CPT | Performed by: NURSE PRACTITIONER

## 2025-05-08 RX ORDER — PHENAZOPYRIDINE HYDROCHLORIDE 100 MG/1
100 TABLET, FILM COATED ORAL
COMMUNITY
Start: 2025-02-18

## 2025-05-08 ASSESSMENT — ENCOUNTER SYMPTOMS
JOINT SWELLING: 0
SPEECH DIFFICULTY: 0
HEMATURIA: 0
FREQUENCY: 0
FEVER: 0
ABDOMINAL DISTENTION: 0
EYE PAIN: 0
WOUND: 0
SORE THROAT: 0
SLEEP DISTURBANCE: 0
DIFFICULTY URINATING: 0
VOMITING: 0
DYSURIA: 0
DIARRHEA: 0
WHEEZING: 0
FLANK PAIN: 0
NERVOUS/ANXIOUS: 0
TROUBLE SWALLOWING: 0
CHOKING: 0
NECK PAIN: 0
CONFUSION: 0
NAUSEA: 0
HEADACHES: 0
FACIAL ASYMMETRY: 0
UNEXPECTED WEIGHT CHANGE: 0
PHOTOPHOBIA: 0
COUGH: 0
CHILLS: 0
PALPITATIONS: 0
NUMBNESS: 0
ARTHRALGIAS: 0
BLOOD IN STOOL: 0
BACK PAIN: 0
APNEA: 0
ABDOMINAL PAIN: 0
EYE REDNESS: 0
SHORTNESS OF BREATH: 0
FATIGUE: 0
CONSTIPATION: 0
DIZZINESS: 0
WEAKNESS: 0
SEIZURES: 0
MYALGIAS: 0
CHEST TIGHTNESS: 0

## 2025-05-08 NOTE — PROGRESS NOTES
"Subjective   Patient ID: Marisabel Cowan is a 30 y.o. female who presents for Annual Exam (WELLNESS).      HPI:  Presents today for WELLNESS WITH LABS. NO LABS DONE.  NO NEW COMPLAINTS      8/18/22 LAST COLON. H/O POLYPS. TO BE REPEATED IN 3 YEARS. WILL ORDER    Visit Vitals  /75   Pulse 82   Ht 1.549 m (5' 1\")   Wt 62.4 kg (137 lb 9.6 oz)   BMI 26.00 kg/m²   OB Status Having periods   Smoking Status Never   BSA 1.64 m²        Review of Systems   Constitutional:  Negative for chills, fatigue, fever and unexpected weight change.   HENT:  Negative for congestion, ear pain, sore throat and trouble swallowing.    Eyes:  Negative for photophobia, pain, redness and visual disturbance.   Respiratory:  Negative for apnea, cough, choking, chest tightness, shortness of breath and wheezing.    Cardiovascular:  Negative for chest pain, palpitations and leg swelling.   Gastrointestinal:  Negative for abdominal distention, abdominal pain, blood in stool, constipation, diarrhea, nausea and vomiting.   Genitourinary:  Negative for difficulty urinating, dysuria, flank pain, frequency, hematuria and urgency.   Musculoskeletal:  Negative for arthralgias, back pain, gait problem, joint swelling, myalgias and neck pain.   Skin:  Negative for rash and wound.   Neurological:  Negative for dizziness, seizures, syncope, facial asymmetry, speech difficulty, weakness, numbness and headaches.   Psychiatric/Behavioral:  Negative for confusion, sleep disturbance and suicidal ideas. The patient is not nervous/anxious.        Objective     Physical Exam  Constitutional:       Appearance: Normal appearance. She is normal weight.   HENT:      Head: Normocephalic.   Eyes:      Extraocular Movements: Extraocular movements intact.      Conjunctiva/sclera: Conjunctivae normal.      Pupils: Pupils are equal, round, and reactive to light.   Cardiovascular:      Rate and Rhythm: Normal rate and regular rhythm.      Pulses: Normal pulses.      Heart " sounds: Normal heart sounds.   Pulmonary:      Effort: Pulmonary effort is normal.      Breath sounds: Normal breath sounds.   Musculoskeletal:         General: Normal range of motion.      Cervical back: Normal range of motion.   Skin:     General: Skin is warm and dry.   Neurological:      General: No focal deficit present.      Mental Status: She is alert and oriented to person, place, and time.   Psychiatric:         Mood and Affect: Mood normal.         Behavior: Behavior normal.         Thought Content: Thought content normal.         Judgment: Judgment normal.            Assessment/Plan   Problem List Items Addressed This Visit       RESOLVED: Childhood asthma (HHS-HCC)    RESOLVED: Rheumatoid arthritis    Vitamin B12 deficiency    Relevant Orders    Vitamin B12    Vitamin B12    Low iron     Other Visit Diagnoses         Wellness examination    -  Primary    Relevant Orders    Hemoglobin A1C    Comprehensive Metabolic Panel    CBC and Auto Differential    Lipid Panel    TSH with reflex to Free T4 if abnormal    Comprehensive Metabolic Panel    CBC and Auto Differential    Hemoglobin A1C    Lipid Panel    TSH with reflex to Free T4 if abnormal      Vitamin D deficiency        Relevant Orders    Vitamin D 25-Hydroxy,Total (for eval of Vitamin D levels)    Parathyroid Hormone, Intact    Vitamin D 25-Hydroxy,Total (for eval of Vitamin D levels)    Parathyroid Hormone, Intact          WE DISCUSSED MOST COMMON SIDE EFFECTS OF PRESCRIBED MEDICATIONS. INDICATIONS, RISK, COMPLICATIONS, AND ALTERNATIVES OF MEDICATION/THERAPEUTICS WERE EXPLAINED AND DISCUSSED. PLEASE MONITOR CLOSELY FOR ANY UNTOWARD SIDE EFFECTS OR COMPLICATIONS OF MEDICATIONS. PATIENT IS STRONGLY ADVISED TO BE COMPLIANT WITH RECOMMENDATIONS. QUESTIONS AND CONCERNS WERE ADDRESSED. INSTRUCTED TO CALL, RETURN SOONER, OR GO TO THE ER,  IF SYMPTOMS PERSIST OR WORSEN. THEY VOICED UNDERSTANDING AND  DENIES FURTHER QUESTIONS AT THIS TIME.    TIME CODE  1.  PREPARATION FOR PATIENT'S VISIT (REVIEWING CHART, CURRENT MEDICAL RECORDS, OUTSIDE HEALTH PROVIDER RECORDS, PREVIOUS HISTORY, EXAM, TEST, PROCEDURE, AND MEDICATIONS)  2. FACE TO FACE ENCOUNTER OBTAINING HISTORY FROM THE PATIENT/FAMILY/CAREGIVERS; PERFORMING EVALUATION AND EXAMINATION; ORDERING TESTS OR PROCEDURES; REFERRING AND COMMUNICATING WITH OTHER HEALTHCARE PROVIDERS; COUNSELING AND EDUCATION OF THE PATIENT/FAMILY/CAREGIVERS; INDEPENDENTLY INTERPRETING RESULTS (TESTS, LABS, PROCEDURES, IMAGING) AND COMMUNICATING AND EXPLAINING RESULTS TO THE PATIENT/FAMILY/CAREGIVERS  3. COORDINATION OF CARE; PREPARING AND PRINTING DISCHARGE INSTRUCTIONS AND ANY EDUCATIONAL MATERIAL FOR THE PATIENT/FAMILY/CAREGIVERS. DOCUMENTING CLINICAL INFORMATION IN THE ELECTRONIC MEDICAL RECORD   4. REVIEWING OARRS AS NEEDED  MDM  1) COMPLEXITY: 1 ACUTE ILLNESS, 1 STABLE CHRONIC CONDITION, OR 2 MINOR PROBLEMS ADDRESSED   2)DATA: TESTS INTERPRETED AND OR ORDERED, TOOK INDEPENDENT HISTORY OR RECORDS REVIEWED  3)RISK: LOW RISK DUE TO NATURE OF MEDICAL CONDITIONS/COMORBIDITY OR MEDICATIONS ORDERED OR SURGICAL OR PROCEDURE REFERRAL    12 MONTHS WELLNESS WITH LABS

## 2025-06-19 ENCOUNTER — TELEPHONE (OUTPATIENT)
Dept: GASTROENTEROLOGY | Facility: CLINIC | Age: 30
End: 2025-06-19
Payer: COMMERCIAL

## 2026-05-14 ENCOUNTER — APPOINTMENT (OUTPATIENT)
Dept: PRIMARY CARE | Facility: CLINIC | Age: 31
End: 2026-05-14
Payer: COMMERCIAL